# Patient Record
Sex: MALE | URBAN - METROPOLITAN AREA
[De-identification: names, ages, dates, MRNs, and addresses within clinical notes are randomized per-mention and may not be internally consistent; named-entity substitution may affect disease eponyms.]

---

## 2019-01-01 ENCOUNTER — APPOINTMENT (OUTPATIENT)
Dept: RADIOLOGY | Facility: MEDICAL CENTER | Age: 59
DRG: 981 | End: 2019-01-01
Attending: INTERNAL MEDICINE

## 2019-01-01 ENCOUNTER — HOSPITAL ENCOUNTER (INPATIENT)
Facility: MEDICAL CENTER | Age: 59
LOS: 5 days | DRG: 981 | End: 2019-01-08
Attending: EMERGENCY MEDICINE | Admitting: INTERNAL MEDICINE

## 2019-01-01 ENCOUNTER — APPOINTMENT (OUTPATIENT)
Dept: CARDIOLOGY | Facility: MEDICAL CENTER | Age: 59
DRG: 981 | End: 2019-01-01
Attending: INTERNAL MEDICINE

## 2019-01-01 VITALS
OXYGEN SATURATION: 62 % | WEIGHT: 158.29 LBS | DIASTOLIC BLOOD PRESSURE: 40 MMHG | TEMPERATURE: 98.6 F | HEART RATE: 68 BPM | HEIGHT: 70 IN | SYSTOLIC BLOOD PRESSURE: 64 MMHG | BODY MASS INDEX: 22.66 KG/M2

## 2019-01-01 DIAGNOSIS — F10.21 HISTORY OF ALCOHOL DEPENDENCE (HCC): ICD-10-CM

## 2019-01-01 DIAGNOSIS — K92.2 UPPER GI BLEED: ICD-10-CM

## 2019-01-01 DIAGNOSIS — R57.8 HEMORRHAGIC SHOCK (HCC): ICD-10-CM

## 2019-01-01 DIAGNOSIS — K72.10 END STAGE LIVER DISEASE (HCC): ICD-10-CM

## 2019-01-01 DIAGNOSIS — K92.2 GASTROINTESTINAL HEMORRHAGE, UNSPECIFIED GASTROINTESTINAL HEMORRHAGE TYPE: ICD-10-CM

## 2019-01-01 DIAGNOSIS — R57.9 SHOCK (HCC): ICD-10-CM

## 2019-01-01 LAB
ABO GROUP BLD: NORMAL
ABO GROUP BLD: NORMAL
ACTION RANGE TRIGGERED IACRT: NO
ACTION RANGE TRIGGERED IACRT: YES
ALBUMIN SERPL BCP-MCNC: 2.1 G/DL (ref 3.2–4.9)
ALBUMIN SERPL BCP-MCNC: 2.6 G/DL (ref 3.2–4.9)
ALBUMIN/GLOB SERPL: 0.6 G/DL
ALBUMIN/GLOB SERPL: 1.1 G/DL
ALP SERPL-CCNC: 70 U/L (ref 30–99)
ALP SERPL-CCNC: 98 U/L (ref 30–99)
ALT SERPL-CCNC: 34 U/L (ref 2–50)
ALT SERPL-CCNC: 38 U/L (ref 2–50)
AMMONIA PLAS-SCNC: 226 UMOL/L (ref 11–45)
AMMONIA PLAS-SCNC: 61 UMOL/L (ref 11–45)
AMOBARBITAL SERPL-MCNC: <50 NG/ML
AMPHET UR QL SCN: NEGATIVE
ANION GAP SERPL CALC-SCNC: 11 MMOL/L (ref 0–11.9)
ANION GAP SERPL CALC-SCNC: 12 MMOL/L (ref 0–11.9)
ANION GAP SERPL CALC-SCNC: 17 MMOL/L (ref 0–11.9)
ANION GAP SERPL CALC-SCNC: 27 MMOL/L (ref 0–11.9)
ANION GAP SERPL CALC-SCNC: 4 MMOL/L (ref 0–11.9)
ANION GAP SERPL CALC-SCNC: 5 MMOL/L (ref 0–11.9)
ANION GAP SERPL CALC-SCNC: 6 MMOL/L (ref 0–11.9)
ANION GAP SERPL CALC-SCNC: 9 MMOL/L (ref 0–11.9)
ANISOCYTOSIS BLD QL SMEAR: ABNORMAL
ANISOCYTOSIS BLD QL SMEAR: ABNORMAL
APAP SERPL-MCNC: <10 UG/ML (ref 10–30)
APAP SERPL-MCNC: <10 UG/ML (ref 10–30)
APPEARANCE UR: CLEAR
APTT PPP: 55.1 SEC (ref 24.7–36)
AST SERPL-CCNC: 60 U/L (ref 12–45)
AST SERPL-CCNC: 72 U/L (ref 12–45)
BACTERIA #/AREA URNS HPF: NEGATIVE /HPF
BACTERIA BLD CULT: NORMAL
BACTERIA BLD CULT: NORMAL
BACTERIA SPEC RESP CULT: ABNORMAL
BACTERIA SPEC RESP CULT: ABNORMAL
BARBITURATES UR QL SCN: NEGATIVE
BARCODED ABORH UBTYP: 5100
BARCODED ABORH UBTYP: 7300
BARCODED PRD CODE UBPRD: NORMAL
BARCODED UNIT NUM UBUNT: NORMAL
BASE EXCESS BLDA CALC-SCNC: -12 MMOL/L (ref -4–3)
BASE EXCESS BLDA CALC-SCNC: -3 MMOL/L (ref -4–3)
BASE EXCESS BLDA CALC-SCNC: -3 MMOL/L (ref -4–3)
BASE EXCESS BLDA CALC-SCNC: -4 MMOL/L (ref -4–3)
BASE EXCESS BLDA CALC-SCNC: -6 MMOL/L (ref -4–3)
BASE EXCESS BLDA CALC-SCNC: -8 MMOL/L (ref -4–3)
BASOPHILS # BLD AUTO: 0 % (ref 0–1.8)
BASOPHILS # BLD AUTO: 0.2 % (ref 0–1.8)
BASOPHILS # BLD AUTO: 0.3 % (ref 0–1.8)
BASOPHILS # BLD AUTO: 0.3 % (ref 0–1.8)
BASOPHILS # BLD AUTO: 0.6 % (ref 0–1.8)
BASOPHILS # BLD: 0 K/UL (ref 0–0.12)
BASOPHILS # BLD: 0.02 K/UL (ref 0–0.12)
BASOPHILS # BLD: 0.03 K/UL (ref 0–0.12)
BENZODIAZ SERPL QL: NEGATIVE
BENZODIAZ UR QL SCN: NEGATIVE
BILIRUB SERPL-MCNC: 2.4 MG/DL (ref 0.1–1.5)
BILIRUB SERPL-MCNC: 2.9 MG/DL (ref 0.1–1.5)
BILIRUB UR QL STRIP.AUTO: NEGATIVE
BLD GP AB SCN SERPL QL: NORMAL
BODY TEMPERATURE: ABNORMAL DEGREES
BUN SERPL-MCNC: 18 MG/DL (ref 8–22)
BUN SERPL-MCNC: 35 MG/DL (ref 8–22)
BUN SERPL-MCNC: 40 MG/DL (ref 8–22)
BUN SERPL-MCNC: 48 MG/DL (ref 8–22)
BUN SERPL-MCNC: 50 MG/DL (ref 8–22)
BUN SERPL-MCNC: 50 MG/DL (ref 8–22)
BUN SERPL-MCNC: 6 MG/DL (ref 8–22)
BUN SERPL-MCNC: 7 MG/DL (ref 8–22)
BURR CELLS BLD QL SMEAR: NORMAL
BUTALBITAL SERPL-MCNC: <50 NG/ML
BZE UR QL SCN: POSITIVE
CA-I BLD ISE-SCNC: 0.97 MMOL/L (ref 1.1–1.3)
CA-I BLD ISE-SCNC: 1.01 MMOL/L (ref 1.1–1.3)
CA-I SERPL-SCNC: 1 MMOL/L (ref 1.1–1.3)
CALCIUM SERPL-MCNC: 6.8 MG/DL (ref 8.5–10.5)
CALCIUM SERPL-MCNC: 6.9 MG/DL (ref 8.5–10.5)
CALCIUM SERPL-MCNC: 7.5 MG/DL (ref 8.5–10.5)
CALCIUM SERPL-MCNC: 7.6 MG/DL (ref 8.5–10.5)
CALCIUM SERPL-MCNC: 7.7 MG/DL (ref 8.5–10.5)
CALCIUM SERPL-MCNC: 7.7 MG/DL (ref 8.5–10.5)
CALCIUM SERPL-MCNC: 7.8 MG/DL (ref 8.5–10.5)
CALCIUM SERPL-MCNC: 7.8 MG/DL (ref 8.5–10.5)
CANNABINOIDS UR QL SCN: NEGATIVE
CFT BLD TEG: 6.4 MIN (ref 5–10)
CFT BLD TEG: 9 MIN (ref 5–10)
CHLORIDE SERPL-SCNC: 105 MMOL/L (ref 96–112)
CHLORIDE SERPL-SCNC: 107 MMOL/L (ref 96–112)
CHLORIDE SERPL-SCNC: 112 MMOL/L (ref 96–112)
CHLORIDE SERPL-SCNC: 114 MMOL/L (ref 96–112)
CHLORIDE SERPL-SCNC: 115 MMOL/L (ref 96–112)
CHLORIDE SERPL-SCNC: 115 MMOL/L (ref 96–112)
CHLORIDE SERPL-SCNC: 116 MMOL/L (ref 96–112)
CHLORIDE SERPL-SCNC: 117 MMOL/L (ref 96–112)
CLOT ANGLE BLD TEG: 59.2 DEGREES (ref 53–72)
CLOT ANGLE BLD TEG: 66.1 DEGREES (ref 53–72)
CLOT LYSIS 30M P MA LENFR BLD TEG: 0 % (ref 0–8)
CO2 BLDA-SCNC: 16 MMOL/L (ref 20–33)
CO2 BLDA-SCNC: 16 MMOL/L (ref 20–33)
CO2 BLDA-SCNC: 17 MMOL/L (ref 20–33)
CO2 BLDA-SCNC: 21 MMOL/L (ref 20–33)
CO2 BLDA-SCNC: 21 MMOL/L (ref 20–33)
CO2 BLDA-SCNC: 22 MMOL/L (ref 20–33)
CO2 SERPL-SCNC: 15 MMOL/L (ref 20–33)
CO2 SERPL-SCNC: 17 MMOL/L (ref 20–33)
CO2 SERPL-SCNC: 18 MMOL/L (ref 20–33)
CO2 SERPL-SCNC: 21 MMOL/L (ref 20–33)
CO2 SERPL-SCNC: 22 MMOL/L (ref 20–33)
CO2 SERPL-SCNC: 22 MMOL/L (ref 20–33)
CO2 SERPL-SCNC: 24 MMOL/L (ref 20–33)
CO2 SERPL-SCNC: 8 MMOL/L (ref 20–33)
COLOR UR: ABNORMAL
COMMENT 1642: NORMAL
COMPONENT FT 8504FT: NORMAL
COMPONENT FT 8504FT: NORMAL
COMPONENT P 8504P: NORMAL
COMPONENT R 8504R: NORMAL
CREAT SERPL-MCNC: 0.42 MG/DL (ref 0.5–1.4)
CREAT SERPL-MCNC: 0.5 MG/DL (ref 0.5–1.4)
CREAT SERPL-MCNC: 0.54 MG/DL (ref 0.5–1.4)
CREAT SERPL-MCNC: 0.85 MG/DL (ref 0.5–1.4)
CREAT SERPL-MCNC: 0.96 MG/DL (ref 0.5–1.4)
CREAT SERPL-MCNC: 1.09 MG/DL (ref 0.5–1.4)
CREAT SERPL-MCNC: 1.74 MG/DL (ref 0.5–1.4)
CREAT SERPL-MCNC: 1.99 MG/DL (ref 0.5–1.4)
CREAT UR-MCNC: 115.2 MG/DL
CRP SERPL HS-MCNC: 1.22 MG/DL (ref 0–0.75)
CT.EXTRINSIC BLD ROTEM: 1.7 MIN (ref 1–3)
CT.EXTRINSIC BLD ROTEM: 2.6 MIN (ref 1–3)
CYTOLOGY REG CYTOL: NORMAL
EOSINOPHIL # BLD AUTO: 0 K/UL (ref 0–0.51)
EOSINOPHIL # BLD AUTO: 0.02 K/UL (ref 0–0.51)
EOSINOPHIL # BLD AUTO: 0.03 K/UL (ref 0–0.51)
EOSINOPHIL # BLD AUTO: 0.08 K/UL (ref 0–0.51)
EOSINOPHIL # BLD AUTO: 0.11 K/UL (ref 0–0.51)
EOSINOPHIL NFR BLD: 0 % (ref 0–6.9)
EOSINOPHIL NFR BLD: 0.2 % (ref 0–6.9)
EOSINOPHIL NFR BLD: 0.3 % (ref 0–6.9)
EOSINOPHIL NFR BLD: 1.6 % (ref 0–6.9)
EOSINOPHIL NFR BLD: 1.8 % (ref 0–6.9)
EPI CELLS #/AREA URNS HPF: NEGATIVE /HPF
ERYTHROCYTE [DISTWIDTH] IN BLOOD BY AUTOMATED COUNT: 53.1 FL (ref 35.9–50)
ERYTHROCYTE [DISTWIDTH] IN BLOOD BY AUTOMATED COUNT: 53.5 FL (ref 35.9–50)
ERYTHROCYTE [DISTWIDTH] IN BLOOD BY AUTOMATED COUNT: 56.1 FL (ref 35.9–50)
ERYTHROCYTE [DISTWIDTH] IN BLOOD BY AUTOMATED COUNT: 62.4 FL (ref 35.9–50)
ERYTHROCYTE [DISTWIDTH] IN BLOOD BY AUTOMATED COUNT: 70.7 FL (ref 35.9–50)
ETHANOL BLD-MCNC: 0 G/DL
FIBRINOGEN PPP-MCNC: 182 MG/DL (ref 215–460)
GLOBULIN SER CALC-MCNC: 2.4 G/DL (ref 1.9–3.5)
GLOBULIN SER CALC-MCNC: 3.4 G/DL (ref 1.9–3.5)
GLUCOSE BLD-MCNC: 100 MG/DL (ref 65–99)
GLUCOSE BLD-MCNC: 106 MG/DL (ref 65–99)
GLUCOSE BLD-MCNC: 107 MG/DL (ref 65–99)
GLUCOSE BLD-MCNC: 117 MG/DL (ref 65–99)
GLUCOSE BLD-MCNC: 119 MG/DL (ref 65–99)
GLUCOSE BLD-MCNC: 119 MG/DL (ref 65–99)
GLUCOSE BLD-MCNC: 123 MG/DL (ref 65–99)
GLUCOSE BLD-MCNC: 129 MG/DL (ref 65–99)
GLUCOSE BLD-MCNC: 129 MG/DL (ref 65–99)
GLUCOSE BLD-MCNC: 130 MG/DL (ref 65–99)
GLUCOSE BLD-MCNC: 143 MG/DL (ref 65–99)
GLUCOSE BLD-MCNC: 81 MG/DL (ref 65–99)
GLUCOSE SERPL-MCNC: 111 MG/DL (ref 65–99)
GLUCOSE SERPL-MCNC: 115 MG/DL (ref 65–99)
GLUCOSE SERPL-MCNC: 122 MG/DL (ref 65–99)
GLUCOSE SERPL-MCNC: 128 MG/DL (ref 65–99)
GLUCOSE SERPL-MCNC: 129 MG/DL (ref 65–99)
GLUCOSE SERPL-MCNC: 146 MG/DL (ref 65–99)
GLUCOSE SERPL-MCNC: 147 MG/DL (ref 65–99)
GLUCOSE SERPL-MCNC: 97 MG/DL (ref 65–99)
GLUCOSE UR STRIP.AUTO-MCNC: NEGATIVE MG/DL
GRAM STN SPEC: ABNORMAL
GRAM STN SPEC: ABNORMAL
HAV IGM SERPL QL IA: NEGATIVE
HBV CORE IGM SER QL: NEGATIVE
HBV SURFACE AG SER QL: NEGATIVE
HCO3 BLDA-SCNC: 15 MMOL/L (ref 17–25)
HCO3 BLDA-SCNC: 15.1 MMOL/L (ref 17–25)
HCO3 BLDA-SCNC: 16.1 MMOL/L (ref 17–25)
HCO3 BLDA-SCNC: 20 MMOL/L (ref 17–25)
HCO3 BLDA-SCNC: 20.5 MMOL/L (ref 17–25)
HCO3 BLDA-SCNC: 20.8 MMOL/L (ref 17–25)
HCT VFR BLD AUTO: 18.7 % (ref 42–52)
HCT VFR BLD AUTO: 20.9 % (ref 42–52)
HCT VFR BLD AUTO: 21.1 % (ref 42–52)
HCT VFR BLD AUTO: 21.2 % (ref 42–52)
HCT VFR BLD AUTO: 21.2 % (ref 42–52)
HCT VFR BLD AUTO: 21.3 % (ref 42–52)
HCT VFR BLD AUTO: 21.5 % (ref 42–52)
HCT VFR BLD AUTO: 21.6 % (ref 42–52)
HCT VFR BLD AUTO: 21.9 % (ref 42–52)
HCT VFR BLD AUTO: 22 % (ref 42–52)
HCT VFR BLD AUTO: 22.4 % (ref 42–52)
HCT VFR BLD AUTO: 22.4 % (ref 42–52)
HCT VFR BLD CALC: 20 % (ref 42–52)
HCT VFR BLD CALC: 23 % (ref 42–52)
HCV AB SER QL: NEGATIVE
HGB BLD-MCNC: 6.2 G/DL (ref 14–18)
HGB BLD-MCNC: 6.8 G/DL (ref 14–18)
HGB BLD-MCNC: 7.2 G/DL (ref 14–18)
HGB BLD-MCNC: 7.3 G/DL (ref 14–18)
HGB BLD-MCNC: 7.3 G/DL (ref 14–18)
HGB BLD-MCNC: 7.4 G/DL (ref 14–18)
HGB BLD-MCNC: 7.5 G/DL (ref 14–18)
HGB BLD-MCNC: 7.6 G/DL (ref 14–18)
HGB BLD-MCNC: 7.6 G/DL (ref 14–18)
HGB BLD-MCNC: 7.8 G/DL (ref 14–18)
HGB BLD-MCNC: 7.8 G/DL (ref 14–18)
HGB BLD-MCNC: 7.9 G/DL (ref 14–18)
HGB BLD-MCNC: 7.9 G/DL (ref 14–18)
HGB BLD-MCNC: 8.1 G/DL (ref 14–18)
HIV 1+2 AB+HIV1 P24 AG SERPL QL IA: NON REACTIVE
HOROWITZ INDEX BLDA+IHG-RTO: 293 MM[HG]
HOROWITZ INDEX BLDA+IHG-RTO: 297 MM[HG]
HOROWITZ INDEX BLDA+IHG-RTO: 333 MM[HG]
HOROWITZ INDEX BLDA+IHG-RTO: 397 MM[HG]
HOROWITZ INDEX BLDA+IHG-RTO: 430 MM[HG]
HOROWITZ INDEX BLDA+IHG-RTO: 437 MM[HG]
HYALINE CASTS #/AREA URNS LPF: ABNORMAL /LPF
IMM GRANULOCYTES # BLD AUTO: 0.01 K/UL (ref 0–0.11)
IMM GRANULOCYTES # BLD AUTO: 0.03 K/UL (ref 0–0.11)
IMM GRANULOCYTES # BLD AUTO: 0.04 K/UL (ref 0–0.11)
IMM GRANULOCYTES # BLD AUTO: 0.04 K/UL (ref 0–0.11)
IMM GRANULOCYTES NFR BLD AUTO: 0.2 % (ref 0–0.9)
IMM GRANULOCYTES NFR BLD AUTO: 0.3 % (ref 0–0.9)
IMM GRANULOCYTES NFR BLD AUTO: 0.3 % (ref 0–0.9)
IMM GRANULOCYTES NFR BLD AUTO: 0.4 % (ref 0–0.9)
INR PPP: 4.98 (ref 0.87–1.13)
INST. QUALIFIED PATIENT IIQPT: YES
KETONES UR STRIP.AUTO-MCNC: ABNORMAL MG/DL
LACTATE BLD-SCNC: 2.1 MMOL/L (ref 0.5–2)
LACTATE BLD-SCNC: 3.2 MMOL/L (ref 0.5–2)
LEUKOCYTE ESTERASE UR QL STRIP.AUTO: ABNORMAL
LIPASE SERPL-CCNC: 10 U/L (ref 11–82)
LV EJECT FRACT  99904: 70
LV EJECT FRACT MOD 2C 99903: 67.63
LV EJECT FRACT MOD 4C 99902: 56.26
LV EJECT FRACT MOD BP 99901: 61.86
LYMPHOCYTES # BLD AUTO: 1.12 K/UL (ref 1–4.8)
LYMPHOCYTES # BLD AUTO: 1.43 K/UL (ref 1–4.8)
LYMPHOCYTES # BLD AUTO: 1.88 K/UL (ref 1–4.8)
LYMPHOCYTES # BLD AUTO: 2.12 K/UL (ref 1–4.8)
LYMPHOCYTES # BLD AUTO: 2.2 K/UL (ref 1–4.8)
LYMPHOCYTES NFR BLD: 13.5 % (ref 22–41)
LYMPHOCYTES NFR BLD: 18.2 % (ref 22–41)
LYMPHOCYTES NFR BLD: 18.7 % (ref 22–41)
LYMPHOCYTES NFR BLD: 22.7 % (ref 22–41)
LYMPHOCYTES NFR BLD: 33.6 % (ref 22–41)
MACROCYTES BLD QL SMEAR: ABNORMAL
MACROCYTES BLD QL SMEAR: ABNORMAL
MAGNESIUM SERPL-MCNC: 1 MG/DL (ref 1.5–2.5)
MAGNESIUM SERPL-MCNC: 1 MG/DL (ref 1.5–2.5)
MAGNESIUM SERPL-MCNC: 1.7 MG/DL (ref 1.5–2.5)
MAGNESIUM SERPL-MCNC: 2.1 MG/DL (ref 1.5–2.5)
MAGNESIUM SERPL-MCNC: 2.4 MG/DL (ref 1.5–2.5)
MANUAL DIFF BLD: NORMAL
MCF BLD TEG: 53.1 MM (ref 50–70)
MCF BLD TEG: 59.6 MM (ref 50–70)
MCH RBC QN AUTO: 30.6 PG (ref 27–33)
MCH RBC QN AUTO: 31.1 PG (ref 27–33)
MCH RBC QN AUTO: 31.7 PG (ref 27–33)
MCH RBC QN AUTO: 32.9 PG (ref 27–33)
MCH RBC QN AUTO: 34.3 PG (ref 27–33)
MCHC RBC AUTO-ENTMCNC: 33.2 G/DL (ref 33.7–35.3)
MCHC RBC AUTO-ENTMCNC: 33.8 G/DL (ref 33.7–35.3)
MCHC RBC AUTO-ENTMCNC: 34.4 G/DL (ref 33.7–35.3)
MCHC RBC AUTO-ENTMCNC: 35.8 G/DL (ref 33.7–35.3)
MCHC RBC AUTO-ENTMCNC: 36.3 G/DL (ref 33.7–35.3)
MCV RBC AUTO: 103.3 FL (ref 81.4–97.8)
MCV RBC AUTO: 88.3 FL (ref 81.4–97.8)
MCV RBC AUTO: 88.9 FL (ref 81.4–97.8)
MCV RBC AUTO: 90.7 FL (ref 81.4–97.8)
MCV RBC AUTO: 92 FL (ref 81.4–97.8)
METHADONE UR QL SCN: NEGATIVE
MICRO URNS: ABNORMAL
MONOCYTES # BLD AUTO: 0.28 K/UL (ref 0–0.85)
MONOCYTES # BLD AUTO: 0.43 K/UL (ref 0–0.85)
MONOCYTES # BLD AUTO: 0.55 K/UL (ref 0–0.85)
MONOCYTES # BLD AUTO: 0.63 K/UL (ref 0–0.85)
MONOCYTES # BLD AUTO: 0.86 K/UL (ref 0–0.85)
MONOCYTES NFR BLD AUTO: 4.4 % (ref 0–13.4)
MONOCYTES NFR BLD AUTO: 5.3 % (ref 0–13.4)
MONOCYTES NFR BLD AUTO: 5.4 % (ref 0–13.4)
MONOCYTES NFR BLD AUTO: 8.1 % (ref 0–13.4)
MONOCYTES NFR BLD AUTO: 8.7 % (ref 0–13.4)
MORPHOLOGY BLD-IMP: NORMAL
MORPHOLOGY BLD-IMP: NORMAL
NEUTROPHILS # BLD AUTO: 3.27 K/UL (ref 1.82–7.42)
NEUTROPHILS # BLD AUTO: 3.79 K/UL (ref 1.82–7.42)
NEUTROPHILS # BLD AUTO: 7.83 K/UL (ref 1.82–7.42)
NEUTROPHILS # BLD AUTO: 8.29 K/UL (ref 1.82–7.42)
NEUTROPHILS # BLD AUTO: 8.83 K/UL (ref 1.82–7.42)
NEUTROPHILS NFR BLD: 60.2 % (ref 44–72)
NEUTROPHILS NFR BLD: 66.2 % (ref 44–72)
NEUTROPHILS NFR BLD: 75 % (ref 44–72)
NEUTROPHILS NFR BLD: 75.6 % (ref 44–72)
NEUTROPHILS NFR BLD: 77.9 % (ref 44–72)
NITRITE UR QL STRIP.AUTO: NEGATIVE
NRBC # BLD AUTO: 0 K/UL
NRBC BLD-RTO: 0 /100 WBC
O2/TOTAL GAS SETTING VFR VENT: 30 %
O2/TOTAL GAS SETTING VFR VENT: 40 %
O2/TOTAL GAS SETTING VFR VENT: 40 %
OPIATES UR QL SCN: NEGATIVE
OSMOLALITY UR: 442 MOSM/KG H2O (ref 300–900)
OVALOCYTES BLD QL SMEAR: NORMAL
OXYCODONE UR QL SCN: NEGATIVE
PA AA BLD-ACNC: 86.5 %
PA AA BLD-ACNC: 90.5 %
PA ADP BLD-ACNC: 78.5 %
PA ADP BLD-ACNC: 93 %
PCO2 BLDA: 19.5 MMHG (ref 26–37)
PCO2 BLDA: 20.6 MMHG (ref 26–37)
PCO2 BLDA: 25.4 MMHG (ref 26–37)
PCO2 BLDA: 30.5 MMHG (ref 26–37)
PCO2 BLDA: 34.3 MMHG (ref 26–37)
PCO2 BLDA: 39.4 MMHG (ref 26–37)
PCO2 TEMP ADJ BLDA: 19.1 MMHG (ref 26–37)
PCO2 TEMP ADJ BLDA: 20 MMHG (ref 26–37)
PCO2 TEMP ADJ BLDA: 25.8 MMHG (ref 26–37)
PCO2 TEMP ADJ BLDA: 30.6 MMHG (ref 26–37)
PCO2 TEMP ADJ BLDA: 34.5 MMHG (ref 26–37)
PCP UR QL SCN: NEGATIVE
PENTOBARB SERPL-MCNC: <50 NG/ML
PH BLDA: 7.19 [PH] (ref 7.4–7.5)
PH BLDA: 7.38 [PH] (ref 7.4–7.5)
PH BLDA: 7.44 [PH] (ref 7.4–7.5)
PH BLDA: 7.47 [PH] (ref 7.4–7.5)
PH BLDA: 7.5 [PH] (ref 7.4–7.5)
PH BLDA: 7.52 [PH] (ref 7.4–7.5)
PH TEMP ADJ BLDA: 7.38 [PH] (ref 7.4–7.5)
PH TEMP ADJ BLDA: 7.44 [PH] (ref 7.4–7.5)
PH TEMP ADJ BLDA: 7.48 [PH] (ref 7.4–7.5)
PH TEMP ADJ BLDA: 7.5 [PH] (ref 7.4–7.5)
PH TEMP ADJ BLDA: 7.53 [PH] (ref 7.4–7.5)
PH UR STRIP.AUTO: 5 [PH]
PHENOBARB SERPL-MCNC: <50 NG/ML
PHOSPHATE SERPL-MCNC: 1.6 MG/DL (ref 2.5–4.5)
PHOSPHATE SERPL-MCNC: 1.7 MG/DL (ref 2.5–4.5)
PHOSPHATE SERPL-MCNC: 3.2 MG/DL (ref 2.5–4.5)
PHOSPHATE SERPL-MCNC: 3.7 MG/DL (ref 2.5–4.5)
PLATELET # BLD AUTO: 121 K/UL (ref 164–446)
PLATELET # BLD AUTO: 124 K/UL (ref 164–446)
PLATELET # BLD AUTO: 135 K/UL (ref 164–446)
PLATELET # BLD AUTO: 139 K/UL (ref 164–446)
PLATELET # BLD AUTO: 140 K/UL (ref 164–446)
PLATELET BLD QL SMEAR: NORMAL
PLATELET BLD QL SMEAR: NORMAL
PMV BLD AUTO: 10.8 FL (ref 9–12.9)
PMV BLD AUTO: 9.6 FL (ref 9–12.9)
PMV BLD AUTO: 9.7 FL (ref 9–12.9)
PMV BLD AUTO: 9.8 FL (ref 9–12.9)
PMV BLD AUTO: 9.8 FL (ref 9–12.9)
PO2 BLDA: 100 MMHG (ref 64–87)
PO2 BLDA: 117 MMHG (ref 64–87)
PO2 BLDA: 119 MMHG (ref 64–87)
PO2 BLDA: 131 MMHG (ref 64–87)
PO2 BLDA: 172 MMHG (ref 64–87)
PO2 BLDA: 89 MMHG (ref 64–87)
PO2 TEMP ADJ BLDA: 100 MMHG (ref 64–87)
PO2 TEMP ADJ BLDA: 121 MMHG (ref 64–87)
PO2 TEMP ADJ BLDA: 128 MMHG (ref 64–87)
PO2 TEMP ADJ BLDA: 169 MMHG (ref 64–87)
PO2 TEMP ADJ BLDA: 90 MMHG (ref 64–87)
POTASSIUM BLD-SCNC: 3.2 MMOL/L (ref 3.6–5.5)
POTASSIUM BLD-SCNC: 3.3 MMOL/L (ref 3.6–5.5)
POTASSIUM SERPL-SCNC: 2.9 MMOL/L (ref 3.6–5.5)
POTASSIUM SERPL-SCNC: 2.9 MMOL/L (ref 3.6–5.5)
POTASSIUM SERPL-SCNC: 3 MMOL/L (ref 3.6–5.5)
POTASSIUM SERPL-SCNC: 3 MMOL/L (ref 3.6–5.5)
POTASSIUM SERPL-SCNC: 3.1 MMOL/L (ref 3.6–5.5)
POTASSIUM SERPL-SCNC: 3.2 MMOL/L (ref 3.6–5.5)
POTASSIUM SERPL-SCNC: 3.6 MMOL/L (ref 3.6–5.5)
POTASSIUM SERPL-SCNC: 3.6 MMOL/L (ref 3.6–5.5)
POTASSIUM SERPL-SCNC: 3.7 MMOL/L (ref 3.6–5.5)
POTASSIUM SERPL-SCNC: 3.8 MMOL/L (ref 3.6–5.5)
POTASSIUM SERPL-SCNC: 3.9 MMOL/L (ref 3.6–5.5)
POTASSIUM SERPL-SCNC: 4.1 MMOL/L (ref 3.6–5.5)
POTASSIUM SERPL-SCNC: 4.1 MMOL/L (ref 3.6–5.5)
PREALB SERPL-MCNC: 8 MG/DL (ref 18–38)
PROCALCITONIN SERPL-MCNC: 7.16 NG/ML
PROCALCITONIN SERPL-MCNC: 8.01 NG/ML
PRODUCT TYPE UPROD: NORMAL
PROPOXYPH UR QL SCN: ABNORMAL
PROT SERPL-MCNC: 5 G/DL (ref 6–8.2)
PROT SERPL-MCNC: 5.5 G/DL (ref 6–8.2)
PROT UR QL STRIP: NEGATIVE MG/DL
PROTHROMBIN TIME: 46.2 SEC (ref 12–14.6)
RBC # BLD AUTO: 1.81 M/UL (ref 4.7–6.1)
RBC # BLD AUTO: 2.35 M/UL (ref 4.7–6.1)
RBC # BLD AUTO: 2.37 M/UL (ref 4.7–6.1)
RBC # BLD AUTO: 2.38 M/UL (ref 4.7–6.1)
RBC # BLD AUTO: 2.4 M/UL (ref 4.7–6.1)
RBC # URNS HPF: ABNORMAL /HPF
RBC BLD AUTO: PRESENT
RBC BLD AUTO: PRESENT
RBC UR QL AUTO: NEGATIVE
RH BLD: NORMAL
RH BLD: NORMAL
RHODAMINE-AURAMINE STN SPEC: NORMAL
SALICYLATES SERPL-MCNC: 0 MG/DL (ref 15–25)
SALICYLATES SERPL-MCNC: 0 MG/DL (ref 15–25)
SAO2 % BLDA: 100 % (ref 93–99)
SAO2 % BLDA: 97 % (ref 93–99)
SAO2 % BLDA: 97 % (ref 93–99)
SAO2 % BLDA: 98 % (ref 93–99)
SAO2 % BLDA: 99 % (ref 93–99)
SAO2 % BLDA: 99 % (ref 93–99)
SECOBARBITAL SERPL-MCNC: <50 NG/ML
SIGNIFICANT IND 70042: ABNORMAL
SIGNIFICANT IND 70042: ABNORMAL
SIGNIFICANT IND 70042: NORMAL
SITE SITE: ABNORMAL
SITE SITE: ABNORMAL
SITE SITE: NORMAL
SMUDGE CELLS BLD QL SMEAR: NORMAL
SODIUM BLD-SCNC: 143 MMOL/L (ref 135–145)
SODIUM BLD-SCNC: 144 MMOL/L (ref 135–145)
SODIUM SERPL-SCNC: 139 MMOL/L (ref 135–145)
SODIUM SERPL-SCNC: 140 MMOL/L (ref 135–145)
SODIUM SERPL-SCNC: 142 MMOL/L (ref 135–145)
SODIUM SERPL-SCNC: 142 MMOL/L (ref 135–145)
SODIUM SERPL-SCNC: 143 MMOL/L (ref 135–145)
SODIUM SERPL-SCNC: 143 MMOL/L (ref 135–145)
SODIUM SERPL-SCNC: 145 MMOL/L (ref 135–145)
SODIUM SERPL-SCNC: 145 MMOL/L (ref 135–145)
SODIUM UR-SCNC: 14 MMOL/L
SOURCE SOURCE: ABNORMAL
SOURCE SOURCE: ABNORMAL
SOURCE SOURCE: NORMAL
SP GR UR STRIP.AUTO: 1.02
SPECIMEN DRAWN FROM PATIENT: ABNORMAL
TEG ALGORITHM TGALG: NORMAL
TEG ALGORITHM TGALG: NORMAL
TEST NAME 95000: NORMAL
TRICYCLICS SERPL QL: NEGATIVE
TRIGL SERPL-MCNC: 96 MG/DL (ref 0–149)
TROPONIN I SERPL-MCNC: 0.19 NG/ML (ref 0–0.04)
TROPONIN I SERPL-MCNC: 0.98 NG/ML (ref 0–0.04)
TROPONIN I SERPL-MCNC: 1.19 NG/ML (ref 0–0.04)
TROPONIN I SERPL-MCNC: 1.58 NG/ML (ref 0–0.04)
UNIT STATUS USTAT: NORMAL
UROBILINOGEN UR STRIP.AUTO-MCNC: 0.2 MG/DL
WBC # BLD AUTO: 10.4 K/UL (ref 4.8–10.8)
WBC # BLD AUTO: 10.6 K/UL (ref 4.8–10.8)
WBC # BLD AUTO: 11.8 K/UL (ref 4.8–10.8)
WBC # BLD AUTO: 4.9 K/UL (ref 4.8–10.8)
WBC # BLD AUTO: 6.3 K/UL (ref 4.8–10.8)
WBC #/AREA URNS HPF: ABNORMAL /HPF

## 2019-01-01 PROCEDURE — 82330 ASSAY OF CALCIUM: CPT

## 2019-01-01 PROCEDURE — 99231 SBSQ HOSP IP/OBS SF/LOW 25: CPT | Performed by: INTERNAL MEDICINE

## 2019-01-01 PROCEDURE — 85014 HEMATOCRIT: CPT | Mod: 91

## 2019-01-01 PROCEDURE — 83605 ASSAY OF LACTIC ACID: CPT | Mod: 91

## 2019-01-01 PROCEDURE — 700111 HCHG RX REV CODE 636 W/ 250 OVERRIDE (IP): Performed by: INTERNAL MEDICINE

## 2019-01-01 PROCEDURE — 700102 HCHG RX REV CODE 250 W/ 637 OVERRIDE(OP): Performed by: INTERNAL MEDICINE

## 2019-01-01 PROCEDURE — 700101 HCHG RX REV CODE 250: Performed by: INTERNAL MEDICINE

## 2019-01-01 PROCEDURE — 30233R1 TRANSFUSION OF NONAUTOLOGOUS PLATELETS INTO PERIPHERAL VEIN, PERCUTANEOUS APPROACH: ICD-10-PCS | Performed by: INTERNAL MEDICINE

## 2019-01-01 PROCEDURE — 700111 HCHG RX REV CODE 636 W/ 250 OVERRIDE (IP): Performed by: HOSPITALIST

## 2019-01-01 PROCEDURE — 500066 HCHG BITE BLOCK, ECT: Performed by: INTERNAL MEDICINE

## 2019-01-01 PROCEDURE — 93306 TTE W/DOPPLER COMPLETE: CPT | Mod: 26 | Performed by: INTERNAL MEDICINE

## 2019-01-01 PROCEDURE — P9045 ALBUMIN (HUMAN), 5%, 250 ML: HCPCS | Performed by: INTERNAL MEDICINE

## 2019-01-01 PROCEDURE — 85025 COMPLETE CBC W/AUTO DIFF WBC: CPT

## 2019-01-01 PROCEDURE — 700101 HCHG RX REV CODE 250: Performed by: HOSPITALIST

## 2019-01-01 PROCEDURE — 85384 FIBRINOGEN ACTIVITY: CPT

## 2019-01-01 PROCEDURE — 84300 ASSAY OF URINE SODIUM: CPT

## 2019-01-01 PROCEDURE — A9270 NON-COVERED ITEM OR SERVICE: HCPCS | Performed by: INTERNAL MEDICINE

## 2019-01-01 PROCEDURE — 36600 WITHDRAWAL OF ARTERIAL BLOOD: CPT

## 2019-01-01 PROCEDURE — 87116 MYCOBACTERIA CULTURE: CPT

## 2019-01-01 PROCEDURE — 700105 HCHG RX REV CODE 258: Performed by: INTERNAL MEDICINE

## 2019-01-01 PROCEDURE — 700105 HCHG RX REV CODE 258: Performed by: HOSPITALIST

## 2019-01-01 PROCEDURE — 83690 ASSAY OF LIPASE: CPT

## 2019-01-01 PROCEDURE — 84484 ASSAY OF TROPONIN QUANT: CPT | Mod: 91

## 2019-01-01 PROCEDURE — 80053 COMPREHEN METABOLIC PANEL: CPT

## 2019-01-01 PROCEDURE — 31624 DX BRONCHOSCOPE/LAVAGE: CPT | Performed by: INTERNAL MEDICINE

## 2019-01-01 PROCEDURE — 36430 TRANSFUSION BLD/BLD COMPNT: CPT

## 2019-01-01 PROCEDURE — P9017 PLASMA 1 DONOR FRZ W/IN 8 HR: HCPCS | Mod: 91

## 2019-01-01 PROCEDURE — 770022 HCHG ROOM/CARE - ICU (200)

## 2019-01-01 PROCEDURE — 160203 HCHG ENDO MINUTES - 1ST 30 MINS LEVEL 4: Performed by: INTERNAL MEDICINE

## 2019-01-01 PROCEDURE — 85347 COAGULATION TIME ACTIVATED: CPT

## 2019-01-01 PROCEDURE — 99152 MOD SED SAME PHYS/QHP 5/>YRS: CPT | Performed by: INTERNAL MEDICINE

## 2019-01-01 PROCEDURE — 87102 FUNGUS ISOLATION CULTURE: CPT

## 2019-01-01 PROCEDURE — 84132 ASSAY OF SERUM POTASSIUM: CPT | Mod: 91

## 2019-01-01 PROCEDURE — 87077 CULTURE AEROBIC IDENTIFY: CPT

## 2019-01-01 PROCEDURE — 99291 CRITICAL CARE FIRST HOUR: CPT | Mod: 25 | Performed by: INTERNAL MEDICINE

## 2019-01-01 PROCEDURE — 70450 CT HEAD/BRAIN W/O DYE: CPT

## 2019-01-01 PROCEDURE — 87070 CULTURE OTHR SPECIMN AEROBIC: CPT

## 2019-01-01 PROCEDURE — 06L38CZ OCCLUSION OF ESOPHAGEAL VEIN WITH EXTRALUMINAL DEVICE, VIA NATURAL OR ARTIFICIAL OPENING ENDOSCOPIC: ICD-10-PCS | Performed by: INTERNAL MEDICINE

## 2019-01-01 PROCEDURE — 85018 HEMOGLOBIN: CPT | Mod: 91

## 2019-01-01 PROCEDURE — C9113 INJ PANTOPRAZOLE SODIUM, VIA: HCPCS | Performed by: INTERNAL MEDICINE

## 2019-01-01 PROCEDURE — 770001 HCHG ROOM/CARE - MED/SURG/GYN PRIV*

## 2019-01-01 PROCEDURE — 85576 BLOOD PLATELET AGGREGATION: CPT

## 2019-01-01 PROCEDURE — 02HV33Z INSERTION OF INFUSION DEVICE INTO SUPERIOR VENA CAVA, PERCUTANEOUS APPROACH: ICD-10-PCS | Performed by: INTERNAL MEDICINE

## 2019-01-01 PROCEDURE — 30233N1 TRANSFUSION OF NONAUTOLOGOUS RED BLOOD CELLS INTO PERIPHERAL VEIN, PERCUTANEOUS APPROACH: ICD-10-PCS | Performed by: INTERNAL MEDICINE

## 2019-01-01 PROCEDURE — 84478 ASSAY OF TRIGLYCERIDES: CPT

## 2019-01-01 PROCEDURE — 85610 PROTHROMBIN TIME: CPT

## 2019-01-01 PROCEDURE — 87205 SMEAR GRAM STAIN: CPT

## 2019-01-01 PROCEDURE — 85007 BL SMEAR W/DIFF WBC COUNT: CPT

## 2019-01-01 PROCEDURE — 88305 TISSUE EXAM BY PATHOLOGIST: CPT

## 2019-01-01 PROCEDURE — 96365 THER/PROPH/DIAG IV INF INIT: CPT

## 2019-01-01 PROCEDURE — 303105 HCHG CATHETER EXTRA

## 2019-01-01 PROCEDURE — 86850 RBC ANTIBODY SCREEN: CPT

## 2019-01-01 PROCEDURE — 82140 ASSAY OF AMMONIA: CPT

## 2019-01-01 PROCEDURE — 71045 X-RAY EXAM CHEST 1 VIEW: CPT

## 2019-01-01 PROCEDURE — P9016 RBC LEUKOCYTES REDUCED: HCPCS

## 2019-01-01 PROCEDURE — 80307 DRUG TEST PRSMV CHEM ANLYZR: CPT

## 2019-01-01 PROCEDURE — 36556 INSERT NON-TUNNEL CV CATH: CPT

## 2019-01-01 PROCEDURE — 94003 VENT MGMT INPAT SUBQ DAY: CPT

## 2019-01-01 PROCEDURE — 85014 HEMATOCRIT: CPT

## 2019-01-01 PROCEDURE — 84132 ASSAY OF SERUM POTASSIUM: CPT

## 2019-01-01 PROCEDURE — 87186 SC STD MICRODIL/AGAR DIL: CPT

## 2019-01-01 PROCEDURE — 87015 SPECIMEN INFECT AGNT CONCNTJ: CPT

## 2019-01-01 PROCEDURE — P9034 PLATELETS, PHERESIS: HCPCS

## 2019-01-01 PROCEDURE — 99233 SBSQ HOSP IP/OBS HIGH 50: CPT | Performed by: HOSPITALIST

## 2019-01-01 PROCEDURE — 80048 BASIC METABOLIC PNL TOTAL CA: CPT | Mod: 91

## 2019-01-01 PROCEDURE — 84100 ASSAY OF PHOSPHORUS: CPT

## 2019-01-01 PROCEDURE — 0DJ68ZZ INSPECTION OF STOMACH, VIA NATURAL OR ARTIFICIAL OPENING ENDOSCOPIC: ICD-10-PCS | Performed by: INTERNAL MEDICINE

## 2019-01-01 PROCEDURE — 302978 HCHG BRONCHOSCOPY-DIAGNOSTIC

## 2019-01-01 PROCEDURE — 99291 CRITICAL CARE FIRST HOUR: CPT

## 2019-01-01 PROCEDURE — 99231 SBSQ HOSP IP/OBS SF/LOW 25: CPT | Performed by: HOSPITALIST

## 2019-01-01 PROCEDURE — 94002 VENT MGMT INPAT INIT DAY: CPT

## 2019-01-01 PROCEDURE — 0BH18EZ INSERTION OF ENDOTRACHEAL AIRWAY INTO TRACHEA, VIA NATURAL OR ARTIFICIAL OPENING ENDOSCOPIC: ICD-10-PCS | Performed by: INTERNAL MEDICINE

## 2019-01-01 PROCEDURE — 87206 SMEAR FLUORESCENT/ACID STAI: CPT

## 2019-01-01 PROCEDURE — 86901 BLOOD TYPING SEROLOGIC RH(D): CPT

## 2019-01-01 PROCEDURE — 0B9F8ZX DRAINAGE OF RIGHT LOWER LUNG LOBE, VIA NATURAL OR ARTIFICIAL OPENING ENDOSCOPIC, DIAGNOSTIC: ICD-10-PCS | Performed by: INTERNAL MEDICINE

## 2019-01-01 PROCEDURE — 700111 HCHG RX REV CODE 636 W/ 250 OVERRIDE (IP): Performed by: EMERGENCY MEDICINE

## 2019-01-01 PROCEDURE — 83735 ASSAY OF MAGNESIUM: CPT

## 2019-01-01 PROCEDURE — 82570 ASSAY OF URINE CREATININE: CPT

## 2019-01-01 PROCEDURE — 99291 CRITICAL CARE FIRST HOUR: CPT | Performed by: INTERNAL MEDICINE

## 2019-01-01 PROCEDURE — 304561 HCHG STAT O2

## 2019-01-01 PROCEDURE — 84295 ASSAY OF SERUM SODIUM: CPT

## 2019-01-01 PROCEDURE — 96367 TX/PROPH/DG ADDL SEQ IV INF: CPT

## 2019-01-01 PROCEDURE — 31645 BRNCHSC W/THER ASPIR 1ST: CPT | Performed by: INTERNAL MEDICINE

## 2019-01-01 PROCEDURE — 5A1945Z RESPIRATORY VENTILATION, 24-96 CONSECUTIVE HOURS: ICD-10-PCS | Performed by: INTERNAL MEDICINE

## 2019-01-01 PROCEDURE — 80048 BASIC METABOLIC PNL TOTAL CA: CPT

## 2019-01-01 PROCEDURE — 94640 AIRWAY INHALATION TREATMENT: CPT

## 2019-01-01 PROCEDURE — 86140 C-REACTIVE PROTEIN: CPT

## 2019-01-01 PROCEDURE — 93306 TTE W/DOPPLER COMPLETE: CPT

## 2019-01-01 PROCEDURE — C9113 INJ PANTOPRAZOLE SODIUM, VIA: HCPCS | Performed by: EMERGENCY MEDICINE

## 2019-01-01 PROCEDURE — 82962 GLUCOSE BLOOD TEST: CPT

## 2019-01-01 PROCEDURE — 82803 BLOOD GASES ANY COMBINATION: CPT

## 2019-01-01 PROCEDURE — 31500 INSERT EMERGENCY AIRWAY: CPT

## 2019-01-01 PROCEDURE — 84134 ASSAY OF PREALBUMIN: CPT

## 2019-01-01 PROCEDURE — 86900 BLOOD TYPING SEROLOGIC ABO: CPT

## 2019-01-01 PROCEDURE — 83935 ASSAY OF URINE OSMOLALITY: CPT

## 2019-01-01 PROCEDURE — 99292 CRITICAL CARE ADDL 30 MIN: CPT | Performed by: INTERNAL MEDICINE

## 2019-01-01 PROCEDURE — C1751 CATH, INF, PER/CENT/MIDLINE: HCPCS

## 2019-01-01 PROCEDURE — 36556 INSERT NON-TUNNEL CV CATH: CPT | Mod: RT | Performed by: INTERNAL MEDICINE

## 2019-01-01 PROCEDURE — 80074 ACUTE HEPATITIS PANEL: CPT

## 2019-01-01 PROCEDURE — C1751 CATH, INF, PER/CENT/MIDLINE: HCPCS | Performed by: INTERNAL MEDICINE

## 2019-01-01 PROCEDURE — 86923 COMPATIBILITY TEST ELECTRIC: CPT

## 2019-01-01 PROCEDURE — 74176 CT ABD & PELVIS W/O CONTRAST: CPT

## 2019-01-01 PROCEDURE — 31500 INSERT EMERGENCY AIRWAY: CPT | Performed by: INTERNAL MEDICINE

## 2019-01-01 PROCEDURE — 82803 BLOOD GASES ANY COMBINATION: CPT | Mod: 91

## 2019-01-01 PROCEDURE — 700105 HCHG RX REV CODE 258

## 2019-01-01 PROCEDURE — 502240 HCHG MISC OR SUPPLY RC 0272: Performed by: INTERNAL MEDICINE

## 2019-01-01 PROCEDURE — 82962 GLUCOSE BLOOD TEST: CPT | Mod: 91

## 2019-01-01 PROCEDURE — 85018 HEMOGLOBIN: CPT

## 2019-01-01 PROCEDURE — 0BCM8ZZ EXTIRPATION OF MATTER FROM BILATERAL LUNGS, VIA NATURAL OR ARTIFICIAL OPENING ENDOSCOPIC: ICD-10-PCS | Performed by: INTERNAL MEDICINE

## 2019-01-01 PROCEDURE — 81001 URINALYSIS AUTO W/SCOPE: CPT

## 2019-01-01 PROCEDURE — 96366 THER/PROPH/DIAG IV INF ADDON: CPT

## 2019-01-01 PROCEDURE — 85027 COMPLETE CBC AUTOMATED: CPT

## 2019-01-01 PROCEDURE — 87040 BLOOD CULTURE FOR BACTERIA: CPT | Mod: 91

## 2019-01-01 PROCEDURE — 30233K1 TRANSFUSION OF NONAUTOLOGOUS FROZEN PLASMA INTO PERIPHERAL VEIN, PERCUTANEOUS APPROACH: ICD-10-PCS | Performed by: EMERGENCY MEDICINE

## 2019-01-01 PROCEDURE — 99153 MOD SED SAME PHYS/QHP EA: CPT | Performed by: INTERNAL MEDICINE

## 2019-01-01 PROCEDURE — 96375 TX/PRO/DX INJ NEW DRUG ADDON: CPT

## 2019-01-01 PROCEDURE — 84484 ASSAY OF TROPONIN QUANT: CPT

## 2019-01-01 PROCEDURE — 88112 CYTOPATH CELL ENHANCE TECH: CPT

## 2019-01-01 PROCEDURE — 84145 PROCALCITONIN (PCT): CPT

## 2019-01-01 PROCEDURE — 87106 FUNGI IDENTIFICATION YEAST: CPT

## 2019-01-01 PROCEDURE — 51702 INSERT TEMP BLADDER CATH: CPT

## 2019-01-01 PROCEDURE — 85730 THROMBOPLASTIN TIME PARTIAL: CPT

## 2019-01-01 PROCEDURE — P9016 RBC LEUKOCYTES REDUCED: HCPCS | Mod: 91

## 2019-01-01 PROCEDURE — 94760 N-INVAS EAR/PLS OXIMETRY 1: CPT

## 2019-01-01 PROCEDURE — 87389 HIV-1 AG W/HIV-1&-2 AB AG IA: CPT

## 2019-01-01 PROCEDURE — 85576 BLOOD PLATELET AGGREGATION: CPT | Mod: 91

## 2019-01-01 PROCEDURE — 700105 HCHG RX REV CODE 258: Performed by: EMERGENCY MEDICINE

## 2019-01-01 PROCEDURE — 96368 THER/DIAG CONCURRENT INF: CPT

## 2019-01-01 PROCEDURE — P9047 ALBUMIN (HUMAN), 25%, 50ML: HCPCS | Performed by: INTERNAL MEDICINE

## 2019-01-01 PROCEDURE — 160048 HCHG OR STATISTICAL LEVEL 1-5: Performed by: INTERNAL MEDICINE

## 2019-01-01 RX ORDER — POTASSIUM CHLORIDE 14.9 MG/ML
20 INJECTION INTRAVENOUS ONCE
Status: COMPLETED | OUTPATIENT
Start: 2019-01-01 | End: 2019-01-01

## 2019-01-01 RX ORDER — ALBUMIN, HUMAN INJ 5% 5 %
25 SOLUTION INTRAVENOUS ONCE
Status: COMPLETED | OUTPATIENT
Start: 2019-01-01 | End: 2019-01-01

## 2019-01-01 RX ORDER — POLYETHYLENE GLYCOL 3350 17 G/17G
1 POWDER, FOR SOLUTION ORAL
Status: DISCONTINUED | OUTPATIENT
Start: 2019-01-01 | End: 2019-01-01

## 2019-01-01 RX ORDER — MAGNESIUM SULFATE HEPTAHYDRATE 40 MG/ML
4 INJECTION, SOLUTION INTRAVENOUS ONCE
Status: COMPLETED | OUTPATIENT
Start: 2019-01-01 | End: 2019-01-01

## 2019-01-01 RX ORDER — SODIUM CHLORIDE 9 MG/ML
INJECTION, SOLUTION INTRAVENOUS CONTINUOUS
Status: DISCONTINUED | OUTPATIENT
Start: 2019-01-01 | End: 2019-01-01

## 2019-01-01 RX ORDER — ONDANSETRON 2 MG/ML
8 INJECTION INTRAMUSCULAR; INTRAVENOUS EVERY 8 HOURS PRN
Status: DISCONTINUED | OUTPATIENT
Start: 2019-01-01 | End: 2019-01-01 | Stop reason: HOSPADM

## 2019-01-01 RX ORDER — POTASSIUM CHLORIDE 7.45 MG/ML
10 INJECTION INTRAVENOUS ONCE
Status: COMPLETED | OUTPATIENT
Start: 2019-01-01 | End: 2019-01-01

## 2019-01-01 RX ORDER — MORPHINE SULFATE 10 MG/ML
5 INJECTION, SOLUTION INTRAMUSCULAR; INTRAVENOUS
Status: DISCONTINUED | OUTPATIENT
Start: 2019-01-01 | End: 2019-01-01

## 2019-01-01 RX ORDER — CALCIUM CHLORIDE 100 MG/ML
1 INJECTION INTRAVENOUS; INTRAVENTRICULAR ONCE
Status: DISCONTINUED | OUTPATIENT
Start: 2019-01-01 | End: 2019-01-01

## 2019-01-01 RX ORDER — PANTOPRAZOLE SODIUM 40 MG/10ML
80 INJECTION, POWDER, LYOPHILIZED, FOR SOLUTION INTRAVENOUS ONCE
Status: COMPLETED | OUTPATIENT
Start: 2019-01-01 | End: 2019-01-01

## 2019-01-01 RX ORDER — ZINC SULFATE 50(220)MG
220 CAPSULE ORAL DAILY
Status: DISCONTINUED | OUTPATIENT
Start: 2019-01-01 | End: 2019-01-01

## 2019-01-01 RX ORDER — SODIUM CHLORIDE 9 MG/ML
500 INJECTION, SOLUTION INTRAVENOUS
Status: COMPLETED | OUTPATIENT
Start: 2019-01-01 | End: 2019-01-01

## 2019-01-01 RX ORDER — MAGNESIUM SULFATE HEPTAHYDRATE 40 MG/ML
2 INJECTION, SOLUTION INTRAVENOUS ONCE
Status: COMPLETED | OUTPATIENT
Start: 2019-01-01 | End: 2019-01-01

## 2019-01-01 RX ORDER — SODIUM CHLORIDE 9 MG/ML
1000 INJECTION, SOLUTION INTRAVENOUS ONCE
Status: COMPLETED | OUTPATIENT
Start: 2019-01-01 | End: 2019-01-01

## 2019-01-01 RX ORDER — IPRATROPIUM BROMIDE AND ALBUTEROL SULFATE 2.5; .5 MG/3ML; MG/3ML
3 SOLUTION RESPIRATORY (INHALATION)
Status: DISCONTINUED | OUTPATIENT
Start: 2019-01-01 | End: 2019-01-01

## 2019-01-01 RX ORDER — ALBUMIN (HUMAN) 12.5 G/50ML
50 SOLUTION INTRAVENOUS ONCE
Status: COMPLETED | OUTPATIENT
Start: 2019-01-01 | End: 2019-01-01

## 2019-01-01 RX ORDER — SODIUM CHLORIDE 9 MG/ML
500 INJECTION, SOLUTION INTRAVENOUS
Status: DISPENSED | OUTPATIENT
Start: 2019-01-01 | End: 2019-01-01

## 2019-01-01 RX ORDER — DEXMEDETOMIDINE HYDROCHLORIDE 4 UG/ML
.1-1.5 INJECTION, SOLUTION INTRAVENOUS CONTINUOUS
Status: DISCONTINUED | OUTPATIENT
Start: 2019-01-01 | End: 2019-01-01

## 2019-01-01 RX ORDER — LORAZEPAM 2 MG/ML
1 CONCENTRATE ORAL
Status: DISCONTINUED | OUTPATIENT
Start: 2019-01-01 | End: 2019-01-01

## 2019-01-01 RX ORDER — LACTULOSE 10 G/15ML
300 SOLUTION ORAL EVERY 6 HOURS
Status: DISCONTINUED | OUTPATIENT
Start: 2019-01-01 | End: 2019-01-01

## 2019-01-01 RX ORDER — SODIUM CHLORIDE 9 MG/ML
INJECTION, SOLUTION INTRAVENOUS
Status: COMPLETED
Start: 2019-01-01 | End: 2019-01-01

## 2019-01-01 RX ORDER — ATROPINE SULFATE 10 MG/ML
2 SOLUTION/ DROPS OPHTHALMIC EVERY 4 HOURS PRN
Status: DISCONTINUED | OUTPATIENT
Start: 2019-01-01 | End: 2019-01-01 | Stop reason: HOSPADM

## 2019-01-01 RX ORDER — LORAZEPAM 2 MG/ML
1-5 INJECTION INTRAMUSCULAR
Status: DISCONTINUED | OUTPATIENT
Start: 2019-01-01 | End: 2019-01-01 | Stop reason: HOSPADM

## 2019-01-01 RX ORDER — FAMOTIDINE 20 MG/1
20 TABLET, FILM COATED ORAL DAILY
Status: DISCONTINUED | OUTPATIENT
Start: 2019-01-01 | End: 2019-01-01

## 2019-01-01 RX ORDER — ONDANSETRON 4 MG/1
8 TABLET, ORALLY DISINTEGRATING ORAL EVERY 8 HOURS PRN
Status: DISCONTINUED | OUTPATIENT
Start: 2019-01-01 | End: 2019-01-01 | Stop reason: HOSPADM

## 2019-01-01 RX ORDER — MIDAZOLAM HYDROCHLORIDE 1 MG/ML
1-5 INJECTION INTRAMUSCULAR; INTRAVENOUS ONCE
Status: COMPLETED | OUTPATIENT
Start: 2019-01-01 | End: 2019-01-01

## 2019-01-01 RX ORDER — SODIUM CHLORIDE 9 MG/ML
INJECTION, SOLUTION INTRAVENOUS
Status: ACTIVE
Start: 2019-01-01 | End: 2019-01-01

## 2019-01-01 RX ORDER — MORPHINE SULFATE 100 MG/5ML
10 SOLUTION ORAL
Status: DISCONTINUED | OUTPATIENT
Start: 2019-01-01 | End: 2019-01-01

## 2019-01-01 RX ORDER — PANTOPRAZOLE SODIUM 40 MG/10ML
40 INJECTION, POWDER, LYOPHILIZED, FOR SOLUTION INTRAVENOUS 2 TIMES DAILY
Status: DISCONTINUED | OUTPATIENT
Start: 2019-01-01 | End: 2019-01-01

## 2019-01-01 RX ORDER — SODIUM CHLORIDE 9 MG/ML
500 INJECTION, SOLUTION INTRAVENOUS ONCE
Status: COMPLETED | OUTPATIENT
Start: 2019-01-01 | End: 2019-01-01

## 2019-01-01 RX ORDER — MORPHINE SULFATE 10 MG/ML
10 INJECTION, SOLUTION INTRAMUSCULAR; INTRAVENOUS
Status: DISCONTINUED | OUTPATIENT
Start: 2019-01-01 | End: 2019-01-01

## 2019-01-01 RX ORDER — PHENYLEPHRINE HCL IN 0.9% NACL 0.5 MG/5ML
100 SYRINGE (ML) INTRAVENOUS
Status: DISCONTINUED | OUTPATIENT
Start: 2019-01-01 | End: 2019-01-01

## 2019-01-01 RX ORDER — MORPHINE SULFATE 100 MG/5ML
10 SOLUTION ORAL
Status: DISCONTINUED | OUTPATIENT
Start: 2019-01-01 | End: 2019-01-01 | Stop reason: HOSPADM

## 2019-01-01 RX ORDER — BISACODYL 10 MG
10 SUPPOSITORY, RECTAL RECTAL
Status: DISCONTINUED | OUTPATIENT
Start: 2019-01-01 | End: 2019-01-01

## 2019-01-01 RX ORDER — THIAMINE MONONITRATE (VIT B1) 100 MG
100 TABLET ORAL DAILY
Status: DISCONTINUED | OUTPATIENT
Start: 2019-01-01 | End: 2019-01-01

## 2019-01-01 RX ORDER — MORPHINE SULFATE 10 MG/ML
5 INJECTION, SOLUTION INTRAMUSCULAR; INTRAVENOUS
Status: DISCONTINUED | OUTPATIENT
Start: 2019-01-01 | End: 2019-01-01 | Stop reason: HOSPADM

## 2019-01-01 RX ORDER — AMOXICILLIN 250 MG
2 CAPSULE ORAL 2 TIMES DAILY
Status: DISCONTINUED | OUTPATIENT
Start: 2019-01-01 | End: 2019-01-01

## 2019-01-01 RX ORDER — MIDAZOLAM HYDROCHLORIDE 1 MG/ML
INJECTION INTRAMUSCULAR; INTRAVENOUS
Status: ACTIVE
Start: 2019-01-01 | End: 2019-01-01

## 2019-01-01 RX ORDER — MORPHINE SULFATE 10 MG/ML
15 INJECTION, SOLUTION INTRAMUSCULAR; INTRAVENOUS
Status: DISCONTINUED | OUTPATIENT
Start: 2019-01-01 | End: 2019-01-01 | Stop reason: HOSPADM

## 2019-01-01 RX ORDER — LACTULOSE 20 G/30ML
30 SOLUTION ORAL 3 TIMES DAILY
Status: DISCONTINUED | OUTPATIENT
Start: 2019-01-01 | End: 2019-01-01

## 2019-01-01 RX ORDER — MAGNESIUM SULFATE HEPTAHYDRATE 40 MG/ML
2 INJECTION, SOLUTION INTRAVENOUS EVERY 6 HOURS
Status: DISCONTINUED | OUTPATIENT
Start: 2019-01-01 | End: 2019-01-01

## 2019-01-01 RX ORDER — LORAZEPAM 2 MG/ML
1-5 CONCENTRATE ORAL
Status: DISCONTINUED | OUTPATIENT
Start: 2019-01-01 | End: 2019-01-01 | Stop reason: HOSPADM

## 2019-01-01 RX ORDER — LORAZEPAM 2 MG/ML
1 INJECTION INTRAMUSCULAR
Status: DISCONTINUED | OUTPATIENT
Start: 2019-01-01 | End: 2019-01-01

## 2019-01-01 RX ORDER — DEXTROSE MONOHYDRATE 25 G/50ML
25 INJECTION, SOLUTION INTRAVENOUS
Status: DISCONTINUED | OUTPATIENT
Start: 2019-01-01 | End: 2019-01-01

## 2019-01-01 RX ORDER — NOREPINEPHRINE BITARTRATE 1 MG/ML
INJECTION, SOLUTION INTRAVENOUS
Status: ACTIVE
Start: 2019-01-01 | End: 2019-01-01

## 2019-01-01 RX ORDER — FOLIC ACID 1 MG/1
1 TABLET ORAL DAILY
Status: DISCONTINUED | OUTPATIENT
Start: 2019-01-01 | End: 2019-01-01

## 2019-01-01 RX ADMIN — IPRATROPIUM BROMIDE AND ALBUTEROL SULFATE 3 ML: .5; 3 SOLUTION RESPIRATORY (INHALATION) at 02:31

## 2019-01-01 RX ADMIN — MORPHINE SULFATE 15 MG: 10 INJECTION INTRAVENOUS at 06:12

## 2019-01-01 RX ADMIN — DEXMEDETOMIDINE HYDROCHLORIDE 0.5 MCG/KG/HR: 100 INJECTION, SOLUTION INTRAVENOUS at 10:43

## 2019-01-01 RX ADMIN — Medication 100 MCG: at 00:45

## 2019-01-01 RX ADMIN — POTASSIUM CHLORIDE 10 MEQ: 7.46 INJECTION, SOLUTION INTRAVENOUS at 20:30

## 2019-01-01 RX ADMIN — LORAZEPAM 1 MG: 2 INJECTION INTRAMUSCULAR at 14:10

## 2019-01-01 RX ADMIN — MORPHINE SULFATE 10 MG: 10 INJECTION INTRAVENOUS at 08:01

## 2019-01-01 RX ADMIN — SODIUM BICARBONATE: 84 INJECTION, SOLUTION INTRAVENOUS at 21:02

## 2019-01-01 RX ADMIN — OCTREOTIDE ACETATE 50 MCG/HR: 200 INJECTION, SOLUTION INTRAVENOUS; SUBCUTANEOUS at 14:58

## 2019-01-01 RX ADMIN — LACTULOSE 300 ML: 10 SOLUTION ORAL at 13:30

## 2019-01-01 RX ADMIN — DEXMEDETOMIDINE HYDROCHLORIDE 1 MCG/KG/HR: 100 INJECTION, SOLUTION INTRAVENOUS at 03:28

## 2019-01-01 RX ADMIN — MORPHINE SULFATE 15 MG: 10 INJECTION INTRAVENOUS at 11:23

## 2019-01-01 RX ADMIN — METRONIDAZOLE 500 MG: 500 INJECTION, SOLUTION INTRAVENOUS at 22:01

## 2019-01-01 RX ADMIN — MORPHINE SULFATE 10 MG: 10 INJECTION INTRAVENOUS at 01:20

## 2019-01-01 RX ADMIN — POTASSIUM CHLORIDE 10 MEQ: 7.46 INJECTION, SOLUTION INTRAVENOUS at 06:47

## 2019-01-01 RX ADMIN — IPRATROPIUM BROMIDE AND ALBUTEROL SULFATE 3 ML: .5; 3 SOLUTION RESPIRATORY (INHALATION) at 11:19

## 2019-01-01 RX ADMIN — LACTULOSE 30 ML: 20 SOLUTION ORAL at 13:12

## 2019-01-01 RX ADMIN — POTASSIUM CHLORIDE 10 MEQ: 7.46 INJECTION, SOLUTION INTRAVENOUS at 09:26

## 2019-01-01 RX ADMIN — IPRATROPIUM BROMIDE AND ALBUTEROL SULFATE 3 ML: .5; 3 SOLUTION RESPIRATORY (INHALATION) at 02:21

## 2019-01-01 RX ADMIN — LORAZEPAM 1 MG: 2 INJECTION INTRAMUSCULAR at 05:53

## 2019-01-01 RX ADMIN — LORAZEPAM 1 MG: 2 INJECTION INTRAMUSCULAR at 17:56

## 2019-01-01 RX ADMIN — PANTOPRAZOLE SODIUM 40 MG: 40 INJECTION, POWDER, LYOPHILIZED, FOR SOLUTION INTRAVENOUS at 17:19

## 2019-01-01 RX ADMIN — LACTULOSE 30 ML: 20 SOLUTION ORAL at 13:27

## 2019-01-01 RX ADMIN — LORAZEPAM 1 MG: 2 INJECTION INTRAMUSCULAR at 01:21

## 2019-01-01 RX ADMIN — SODIUM CHLORIDE: 9 INJECTION, SOLUTION INTRAVENOUS at 19:51

## 2019-01-01 RX ADMIN — POTASSIUM PHOSPHATE, MONOBASIC AND POTASSIUM PHOSPHATE, DIBASIC 30 MMOL: 224; 236 INJECTION, SOLUTION INTRAVENOUS at 09:41

## 2019-01-01 RX ADMIN — IPRATROPIUM BROMIDE AND ALBUTEROL SULFATE 3 ML: .5; 3 SOLUTION RESPIRATORY (INHALATION) at 22:59

## 2019-01-01 RX ADMIN — MORPHINE SULFATE 10 MG: 10 INJECTION INTRAVENOUS at 09:38

## 2019-01-01 RX ADMIN — SODIUM BICARBONATE: 84 INJECTION, SOLUTION INTRAVENOUS at 05:59

## 2019-01-01 RX ADMIN — IPRATROPIUM BROMIDE AND ALBUTEROL SULFATE 3 ML: .5; 3 SOLUTION RESPIRATORY (INHALATION) at 11:53

## 2019-01-01 RX ADMIN — SODIUM CHLORIDE: 9 INJECTION, SOLUTION INTRAVENOUS at 03:41

## 2019-01-01 RX ADMIN — SODIUM CHLORIDE: 9 INJECTION, SOLUTION INTRAVENOUS at 23:55

## 2019-01-01 RX ADMIN — Medication 220 MG: at 13:26

## 2019-01-01 RX ADMIN — MORPHINE SULFATE 15 MG: 10 INJECTION INTRAVENOUS at 13:00

## 2019-01-01 RX ADMIN — MAGNESIUM SULFATE IN WATER 2 G: 40 INJECTION, SOLUTION INTRAVENOUS at 10:56

## 2019-01-01 RX ADMIN — Medication 220 MG: at 05:23

## 2019-01-01 RX ADMIN — OCTREOTIDE ACETATE 50 MCG/HR: 200 INJECTION, SOLUTION INTRAVENOUS; SUBCUTANEOUS at 16:19

## 2019-01-01 RX ADMIN — IPRATROPIUM BROMIDE AND ALBUTEROL SULFATE 3 ML: .5; 3 SOLUTION RESPIRATORY (INHALATION) at 06:54

## 2019-01-01 RX ADMIN — NOREPINEPHRINE BITARTRATE 10 MCG/MIN: 1 INJECTION INTRAVENOUS at 03:03

## 2019-01-01 RX ADMIN — LACTULOSE 300 ML: 10 SOLUTION ORAL at 01:20

## 2019-01-01 RX ADMIN — MAGNESIUM SULFATE IN WATER 2 G: 40 INJECTION, SOLUTION INTRAVENOUS at 07:59

## 2019-01-01 RX ADMIN — Medication 100 MG: at 05:23

## 2019-01-01 RX ADMIN — POTASSIUM CHLORIDE 20 MEQ: 14.9 INJECTION, SOLUTION INTRAVENOUS at 18:50

## 2019-01-01 RX ADMIN — IPRATROPIUM BROMIDE AND ALBUTEROL SULFATE 3 ML: .5; 3 SOLUTION RESPIRATORY (INHALATION) at 07:22

## 2019-01-01 RX ADMIN — IPRATROPIUM BROMIDE AND ALBUTEROL SULFATE 3 ML: .5; 3 SOLUTION RESPIRATORY (INHALATION) at 21:18

## 2019-01-01 RX ADMIN — IPRATROPIUM BROMIDE AND ALBUTEROL SULFATE 3 ML: .5; 3 SOLUTION RESPIRATORY (INHALATION) at 16:32

## 2019-01-01 RX ADMIN — MORPHINE SULFATE 10 MG: 10 INJECTION INTRAVENOUS at 06:00

## 2019-01-01 RX ADMIN — METRONIDAZOLE 500 MG: 500 INJECTION, SOLUTION INTRAVENOUS at 20:31

## 2019-01-01 RX ADMIN — OCTREOTIDE ACETATE 50 MCG/HR: 200 INJECTION, SOLUTION INTRAVENOUS; SUBCUTANEOUS at 18:49

## 2019-01-01 RX ADMIN — VASOPRESSIN 0.03 UNITS/MIN: 20 INJECTION INTRAVENOUS at 21:15

## 2019-01-01 RX ADMIN — IPRATROPIUM BROMIDE AND ALBUTEROL SULFATE 3 ML: .5; 3 SOLUTION RESPIRATORY (INHALATION) at 11:04

## 2019-01-01 RX ADMIN — IPRATROPIUM BROMIDE AND ALBUTEROL SULFATE 3 ML: .5; 3 SOLUTION RESPIRATORY (INHALATION) at 22:14

## 2019-01-01 RX ADMIN — LACTULOSE 30 ML: 20 SOLUTION ORAL at 17:46

## 2019-01-01 RX ADMIN — LORAZEPAM 1 MG: 2 INJECTION INTRAMUSCULAR at 20:31

## 2019-01-01 RX ADMIN — ALBUMIN (HUMAN) 25 G: 2.5 SOLUTION INTRAVENOUS at 11:12

## 2019-01-01 RX ADMIN — LORAZEPAM 1 MG: 2 INJECTION INTRAMUSCULAR at 11:54

## 2019-01-01 RX ADMIN — LORAZEPAM 1 MG: 2 INJECTION INTRAMUSCULAR at 02:52

## 2019-01-01 RX ADMIN — RIFAXIMIN 550 MG: 550 TABLET ORAL at 17:15

## 2019-01-01 RX ADMIN — POTASSIUM CHLORIDE 20 MEQ: 14.9 INJECTION, SOLUTION INTRAVENOUS at 00:52

## 2019-01-01 RX ADMIN — FENTANYL CITRATE 100 MCG: 50 INJECTION, SOLUTION INTRAMUSCULAR; INTRAVENOUS at 09:45

## 2019-01-01 RX ADMIN — MORPHINE SULFATE 15 MG: 10 INJECTION INTRAVENOUS at 15:26

## 2019-01-01 RX ADMIN — PROPOFOL 20 MCG/KG/MIN: 10 INJECTION, EMULSION INTRAVENOUS at 18:30

## 2019-01-01 RX ADMIN — FENTANYL CITRATE 50 MCG/HR: 50 INJECTION, SOLUTION INTRAMUSCULAR; INTRAVENOUS at 21:57

## 2019-01-01 RX ADMIN — IPRATROPIUM BROMIDE AND ALBUTEROL SULFATE 3 ML: .5; 3 SOLUTION RESPIRATORY (INHALATION) at 02:57

## 2019-01-01 RX ADMIN — FENTANYL CITRATE 100 MCG: 50 INJECTION, SOLUTION INTRAMUSCULAR; INTRAVENOUS at 10:40

## 2019-01-01 RX ADMIN — MORPHINE SULFATE 15 MG: 10 INJECTION INTRAVENOUS at 00:59

## 2019-01-01 RX ADMIN — LACTULOSE 300 ML: 10 SOLUTION ORAL at 18:23

## 2019-01-01 RX ADMIN — PROPOFOL 40 MCG/KG/MIN: 10 INJECTION, EMULSION INTRAVENOUS at 06:43

## 2019-01-01 RX ADMIN — PROPOFOL 10 MCG/KG/MIN: 10 INJECTION, EMULSION INTRAVENOUS at 18:08

## 2019-01-01 RX ADMIN — SODIUM CHLORIDE: 9 INJECTION, SOLUTION INTRAVENOUS at 06:27

## 2019-01-01 RX ADMIN — LORAZEPAM 1 MG: 2 INJECTION INTRAMUSCULAR at 23:09

## 2019-01-01 RX ADMIN — SODIUM CHLORIDE 1000 ML: 9 INJECTION, SOLUTION INTRAVENOUS at 14:00

## 2019-01-01 RX ADMIN — LORAZEPAM 1 MG: 2 INJECTION INTRAMUSCULAR at 13:03

## 2019-01-01 RX ADMIN — FOLIC ACID: 5 INJECTION, SOLUTION INTRAMUSCULAR; INTRAVENOUS; SUBCUTANEOUS at 08:36

## 2019-01-01 RX ADMIN — MIDAZOLAM HYDROCHLORIDE 3 MG: 1 INJECTION, SOLUTION INTRAMUSCULAR; INTRAVENOUS at 15:15

## 2019-01-01 RX ADMIN — SODIUM CHLORIDE 8 MG/HR: 9 INJECTION, SOLUTION INTRAVENOUS at 14:37

## 2019-01-01 RX ADMIN — LORAZEPAM 4 MG: 2 INJECTION INTRAMUSCULAR at 12:38

## 2019-01-01 RX ADMIN — ALBUMIN (HUMAN) 50 G: 5 SOLUTION INTRAVENOUS at 21:33

## 2019-01-01 RX ADMIN — MORPHINE SULFATE 5 MG: 10 INJECTION INTRAVENOUS at 20:46

## 2019-01-01 RX ADMIN — MORPHINE SULFATE 5 MG: 10 INJECTION INTRAVENOUS at 20:26

## 2019-01-01 RX ADMIN — METRONIDAZOLE 500 MG: 500 INJECTION, SOLUTION INTRAVENOUS at 13:27

## 2019-01-01 RX ADMIN — PROPOFOL 10 MCG/KG/MIN: 10 INJECTION, EMULSION INTRAVENOUS at 13:10

## 2019-01-01 RX ADMIN — METRONIDAZOLE 500 MG: 500 INJECTION, SOLUTION INTRAVENOUS at 05:56

## 2019-01-01 RX ADMIN — PROPOFOL 40 MCG/KG/MIN: 10 INJECTION, EMULSION INTRAVENOUS at 00:51

## 2019-01-01 RX ADMIN — LORAZEPAM 1 MG: 2 INJECTION INTRAMUSCULAR at 22:29

## 2019-01-01 RX ADMIN — PANTOPRAZOLE SODIUM 40 MG: 40 INJECTION, POWDER, LYOPHILIZED, FOR SOLUTION INTRAVENOUS at 05:26

## 2019-01-01 RX ADMIN — LORAZEPAM 4 MG: 2 INJECTION INTRAMUSCULAR at 11:04

## 2019-01-01 RX ADMIN — MORPHINE SULFATE 10 MG: 10 INJECTION INTRAVENOUS at 10:03

## 2019-01-01 RX ADMIN — POTASSIUM CHLORIDE 10 MEQ: 7.46 INJECTION, SOLUTION INTRAVENOUS at 00:59

## 2019-01-01 RX ADMIN — POTASSIUM CHLORIDE 20 MEQ: 14.9 INJECTION, SOLUTION INTRAVENOUS at 06:44

## 2019-01-01 RX ADMIN — POTASSIUM CHLORIDE 20 MEQ: 14.9 INJECTION, SOLUTION INTRAVENOUS at 00:59

## 2019-01-01 RX ADMIN — METRONIDAZOLE 500 MG: 500 INJECTION, SOLUTION INTRAVENOUS at 05:23

## 2019-01-01 RX ADMIN — Medication 100 MCG: at 00:57

## 2019-01-01 RX ADMIN — THERA TABS 1 TABLET: TAB at 05:24

## 2019-01-01 RX ADMIN — LORAZEPAM 1 MG: 2 INJECTION INTRAMUSCULAR at 10:47

## 2019-01-01 RX ADMIN — SODIUM CHLORIDE 8 MG/HR: 9 INJECTION, SOLUTION INTRAVENOUS at 09:54

## 2019-01-01 RX ADMIN — POTASSIUM CHLORIDE 10 MEQ: 7.46 INJECTION, SOLUTION INTRAVENOUS at 17:15

## 2019-01-01 RX ADMIN — SODIUM CHLORIDE: 9 INJECTION, SOLUTION INTRAVENOUS at 01:45

## 2019-01-01 RX ADMIN — LACTULOSE 30 ML: 20 SOLUTION ORAL at 05:24

## 2019-01-01 RX ADMIN — DEXMEDETOMIDINE HYDROCHLORIDE 0.5 MCG/KG/HR: 100 INJECTION, SOLUTION INTRAVENOUS at 20:30

## 2019-01-01 RX ADMIN — MORPHINE SULFATE 15 MG: 10 INJECTION INTRAVENOUS at 17:56

## 2019-01-01 RX ADMIN — CEFTRIAXONE SODIUM 1 G: 1 INJECTION, POWDER, FOR SOLUTION INTRAMUSCULAR; INTRAVENOUS at 19:24

## 2019-01-01 RX ADMIN — LORAZEPAM 1 MG: 2 INJECTION INTRAMUSCULAR at 10:14

## 2019-01-01 RX ADMIN — CALCIUM CHLORIDE 1 G: 100 INJECTION, SOLUTION INTRAVENOUS at 06:33

## 2019-01-01 RX ADMIN — LACTULOSE 300 ML: 10 SOLUTION ORAL at 06:48

## 2019-01-01 RX ADMIN — METRONIDAZOLE 500 MG: 500 INJECTION, SOLUTION INTRAVENOUS at 05:06

## 2019-01-01 RX ADMIN — IPRATROPIUM BROMIDE AND ALBUTEROL SULFATE 3 ML: .5; 3 SOLUTION RESPIRATORY (INHALATION) at 18:16

## 2019-01-01 RX ADMIN — IPRATROPIUM BROMIDE AND ALBUTEROL SULFATE 3 ML: .5; 3 SOLUTION RESPIRATORY (INHALATION) at 07:24

## 2019-01-01 RX ADMIN — METRONIDAZOLE 500 MG: 500 INJECTION, SOLUTION INTRAVENOUS at 20:30

## 2019-01-01 RX ADMIN — CEFTRIAXONE SODIUM 1 G: 1 INJECTION, POWDER, FOR SOLUTION INTRAMUSCULAR; INTRAVENOUS at 05:25

## 2019-01-01 RX ADMIN — POTASSIUM CHLORIDE 10 MEQ: 7.46 INJECTION, SOLUTION INTRAVENOUS at 06:25

## 2019-01-01 RX ADMIN — FENTANYL CITRATE 100 MCG: 50 INJECTION, SOLUTION INTRAMUSCULAR; INTRAVENOUS at 15:17

## 2019-01-01 RX ADMIN — MORPHINE SULFATE 10 MG: 10 INJECTION INTRAVENOUS at 02:49

## 2019-01-01 RX ADMIN — POTASSIUM CHLORIDE 20 MEQ: 14.9 INJECTION, SOLUTION INTRAVENOUS at 13:53

## 2019-01-01 RX ADMIN — METRONIDAZOLE 500 MG: 500 INJECTION, SOLUTION INTRAVENOUS at 13:11

## 2019-01-01 RX ADMIN — POTASSIUM CHLORIDE 10 MEQ: 7.46 INJECTION, SOLUTION INTRAVENOUS at 14:58

## 2019-01-01 RX ADMIN — MORPHINE SULFATE 10 MG: 10 INJECTION INTRAVENOUS at 22:26

## 2019-01-01 RX ADMIN — FENTANYL CITRATE 50 MCG/HR: 50 INJECTION, SOLUTION INTRAMUSCULAR; INTRAVENOUS at 13:27

## 2019-01-01 RX ADMIN — CEFTRIAXONE SODIUM 1 G: 1 INJECTION, POWDER, FOR SOLUTION INTRAMUSCULAR; INTRAVENOUS at 05:03

## 2019-01-01 RX ADMIN — POTASSIUM CHLORIDE 20 MEQ: 14.9 INJECTION, SOLUTION INTRAVENOUS at 20:30

## 2019-01-01 RX ADMIN — LACTULOSE 30 ML: 20 SOLUTION ORAL at 17:15

## 2019-01-01 RX ADMIN — PANTOPRAZOLE SODIUM 40 MG: 40 INJECTION, POWDER, LYOPHILIZED, FOR SOLUTION INTRAVENOUS at 17:46

## 2019-01-01 RX ADMIN — LORAZEPAM 1 MG: 2 INJECTION INTRAMUSCULAR at 04:53

## 2019-01-01 RX ADMIN — MORPHINE SULFATE 15 MG: 10 INJECTION INTRAVENOUS at 04:16

## 2019-01-01 RX ADMIN — METRONIDAZOLE 500 MG: 500 INJECTION, SOLUTION INTRAVENOUS at 15:12

## 2019-01-01 RX ADMIN — LACTULOSE 300 ML: 10 SOLUTION ORAL at 00:08

## 2019-01-01 RX ADMIN — IPRATROPIUM BROMIDE AND ALBUTEROL SULFATE 3 ML: .5; 3 SOLUTION RESPIRATORY (INHALATION) at 16:24

## 2019-01-01 RX ADMIN — MORPHINE SULFATE 15 MG: 10 INJECTION INTRAVENOUS at 16:39

## 2019-01-01 RX ADMIN — SODIUM CHLORIDE: 9 INJECTION, SOLUTION INTRAVENOUS at 10:35

## 2019-01-01 RX ADMIN — SODIUM CHLORIDE 500 ML: 9 INJECTION, SOLUTION INTRAVENOUS at 11:33

## 2019-01-01 RX ADMIN — SODIUM CHLORIDE: 9 INJECTION, SOLUTION INTRAVENOUS at 18:35

## 2019-01-01 RX ADMIN — LORAZEPAM 1 MG: 2 INJECTION INTRAMUSCULAR at 08:01

## 2019-01-01 RX ADMIN — MORPHINE SULFATE 15 MG: 10 INJECTION INTRAVENOUS at 21:52

## 2019-01-01 RX ADMIN — NOREPINEPHRINE BITARTRATE 15 MCG/MIN: 1 INJECTION INTRAVENOUS at 10:45

## 2019-01-01 RX ADMIN — LORAZEPAM 1 MG: 2 INJECTION INTRAMUSCULAR at 06:03

## 2019-01-01 RX ADMIN — MAGNESIUM SULFATE IN WATER 4 G: 40 INJECTION, SOLUTION INTRAVENOUS at 11:31

## 2019-01-01 RX ADMIN — IPRATROPIUM BROMIDE AND ALBUTEROL SULFATE 3 ML: .5; 3 SOLUTION RESPIRATORY (INHALATION) at 22:33

## 2019-01-01 RX ADMIN — VASOPRESSIN 0.03 UNITS/MIN: 20 INJECTION INTRAVENOUS at 10:45

## 2019-01-01 RX ADMIN — NOREPINEPHRINE BITARTRATE 15 MCG/MIN: 1 INJECTION INTRAVENOUS at 14:28

## 2019-01-01 RX ADMIN — LORAZEPAM 1 MG: 2 INJECTION INTRAMUSCULAR at 15:26

## 2019-01-01 RX ADMIN — MORPHINE SULFATE 10 MG: 10 INJECTION INTRAVENOUS at 10:47

## 2019-01-01 RX ADMIN — RIFAXIMIN 550 MG: 550 TABLET ORAL at 05:23

## 2019-01-01 RX ADMIN — FOLIC ACID 1 MG: 1 TABLET ORAL at 05:24

## 2019-01-01 RX ADMIN — MORPHINE SULFATE 10 MG: 10 INJECTION INTRAVENOUS at 04:49

## 2019-01-01 RX ADMIN — LORAZEPAM 1 MG: 2 INJECTION INTRAMUSCULAR at 20:55

## 2019-01-01 RX ADMIN — VASOPRESSIN 0.03 UNITS/MIN: 20 INJECTION INTRAVENOUS at 09:30

## 2019-01-01 RX ADMIN — POTASSIUM CHLORIDE 20 MEQ: 14.9 INJECTION, SOLUTION INTRAVENOUS at 13:40

## 2019-01-01 RX ADMIN — PANTOPRAZOLE SODIUM 40 MG: 40 INJECTION, POWDER, LYOPHILIZED, FOR SOLUTION INTRAVENOUS at 09:40

## 2019-01-01 RX ADMIN — CEFTRIAXONE SODIUM 1 G: 1 INJECTION, POWDER, FOR SOLUTION INTRAMUSCULAR; INTRAVENOUS at 12:23

## 2019-01-01 RX ADMIN — SODIUM CHLORIDE 8 MG/HR: 9 INJECTION, SOLUTION INTRAVENOUS at 20:56

## 2019-01-01 RX ADMIN — LACTULOSE 300 ML: 10 SOLUTION ORAL at 05:00

## 2019-01-01 RX ADMIN — LORAZEPAM 1 MG: 2 INJECTION INTRAMUSCULAR at 16:39

## 2019-01-01 RX ADMIN — PANTOPRAZOLE SODIUM 80 MG: 40 INJECTION, POWDER, LYOPHILIZED, FOR SOLUTION INTRAVENOUS at 14:15

## 2019-01-01 RX ADMIN — SODIUM CHLORIDE 1000 ML: 9 INJECTION, SOLUTION INTRAVENOUS at 01:15

## 2019-01-01 RX ADMIN — MORPHINE SULFATE 10 MG: 10 INJECTION INTRAVENOUS at 11:54

## 2019-01-01 RX ADMIN — RIFAXIMIN 550 MG: 550 TABLET ORAL at 13:26

## 2019-01-01 RX ADMIN — CALCIUM GLUCONATE 1 G: 98 INJECTION, SOLUTION INTRAVENOUS at 01:56

## 2019-01-01 RX ADMIN — SODIUM CHLORIDE 1 ML: 9 INJECTION, SOLUTION INTRAVENOUS at 20:48

## 2019-01-01 RX ADMIN — MORPHINE SULFATE 15 MG: 10 INJECTION INTRAVENOUS at 14:09

## 2019-01-01 RX ADMIN — SODIUM CHLORIDE 8 MG/HR: 9 INJECTION, SOLUTION INTRAVENOUS at 01:02

## 2019-01-01 RX ADMIN — POTASSIUM PHOSPHATE, MONOBASIC AND POTASSIUM PHOSPHATE, DIBASIC 30 MMOL: 224; 236 INJECTION, SOLUTION INTRAVENOUS at 10:56

## 2019-01-01 RX ADMIN — SODIUM CHLORIDE 1000 ML: 9 INJECTION, SOLUTION INTRAVENOUS at 20:45

## 2019-01-01 RX ADMIN — IPRATROPIUM BROMIDE AND ALBUTEROL SULFATE 3 ML: .5; 3 SOLUTION RESPIRATORY (INHALATION) at 18:34

## 2019-01-01 RX ADMIN — POTASSIUM CHLORIDE 20 MEQ: 14.9 INJECTION, SOLUTION INTRAVENOUS at 07:59

## 2019-01-01 RX ADMIN — LORAZEPAM 1 MG: 2 INJECTION INTRAMUSCULAR at 09:41

## 2019-01-01 RX ADMIN — LORAZEPAM 1 MG: 2 INJECTION INTRAMUSCULAR at 19:24

## 2019-01-01 RX ADMIN — FOLIC ACID: 5 INJECTION, SOLUTION INTRAMUSCULAR; INTRAVENOUS; SUBCUTANEOUS at 09:40

## 2019-01-01 RX ADMIN — Medication 100 MCG: at 00:30

## 2019-01-01 ASSESSMENT — ENCOUNTER SYMPTOMS
RESPIRATORY NEGATIVE: 1
CARDIOVASCULAR NEGATIVE: 1
GASTROINTESTINAL NEGATIVE: 1
WEAKNESS: 1

## 2019-01-01 ASSESSMENT — PAIN SCALES - GENERAL
PAINLEVEL_OUTOF10: 0

## 2019-01-03 PROBLEM — R57.1 HYPOVOLEMIC SHOCK (HCC): Status: ACTIVE | Noted: 2019-01-01

## 2019-01-03 PROBLEM — K72.10 END STAGE LIVER DISEASE (HCC): Status: ACTIVE | Noted: 2019-01-01

## 2019-01-03 PROBLEM — F10.21 HISTORY OF ALCOHOL DEPENDENCE (HCC): Status: ACTIVE | Noted: 2019-01-01

## 2019-01-03 PROBLEM — F17.200 TOBACCO DEPENDENCE: Status: ACTIVE | Noted: 2019-01-01

## 2019-01-03 PROBLEM — J96.01 ACUTE RESPIRATORY FAILURE WITH HYPOXIA (HCC): Status: ACTIVE | Noted: 2019-01-01

## 2019-01-03 PROBLEM — K92.2 GI BLEEDING: Status: ACTIVE | Noted: 2019-01-01

## 2019-01-03 PROBLEM — D62 ACUTE BLOOD LOSS ANEMIA: Status: ACTIVE | Noted: 2019-01-01

## 2019-01-03 PROBLEM — G92.8 TOXIC METABOLIC ENCEPHALOPATHY: Status: ACTIVE | Noted: 2019-01-01

## 2019-01-03 NOTE — ED PROVIDER NOTES
"ED Provider Note    ED Provider Note    Primary care provider: No primary care provider on file.  Means of arrival: EMS  History obtained from: Patient    CHIEF COMPLAINT  Chief Complaint   Patient presents with   • Lower GI Bleed     Seen at 1:43 PM.   HPI  Filiberto Rahman is a 58 y.o. male who presents to the Emergency Department with altered mental status and apparent GI bleed.  The history is not entirely clear.  The patient's roommate contacted EMS.  They arrived to find the patient minimally responsive, hypotensive with a copious amount of hematemesis on the floor as well as melena.    Further history is limited due to patient condition.    REVIEW OF SYSTEMS  See HPI, unable to obtain any additional review of systems.  PAST MEDICAL HISTORY   Liver disease    SURGICAL HISTORY  patient denies any surgical history    SOCIAL HISTORY  Social History   Substance Use Topics   • Smoking status: Not on file   • Smokeless tobacco: Not on file   • Alcohol use Not on file      History   Drug use: Unknown       FAMILY HISTORY  No family history on file.    CURRENT MEDICATIONS  Reviewed.  See Encounter Summary.     ALLERGIES  No Known Allergies    PHYSICAL EXAM  VITAL SIGNS: /66   Pulse 96   Temp 36.3 °C (97.4 °F) (Temporal)   Resp 20   Ht 1.778 m (5' 10\")   Wt 58.5 kg (128 lb 15.5 oz)   SpO2 100%   BMI 18.51 kg/m²   Constitutional: Awake, states name.  Emaciated, ill-appearing.  HENT: Normocephalic, atraumatic, bilateral external ears normal. Nose normal.   Eyes: icteric sclera, PERRLA  Thorax & Lungs:  Clear to ascultation bilaterally.  Cardiovascular: Tachycardic   abdomen:  Soft, nondistended, normal active bowel sounds.   Copious melena dried on the patient's legs.  :    Skin: Visualized skin is  Dry, No erythema, No rash.   Musculoskeletal:   No cyanosis, clubbing or edema.  Neurologic: Minimally responsive, does appear to move all 4 extremities.  Pupils are equal and reactive bilaterally.  Psychiatric: " Unable to assess.  Lymphatic:  No cervical LAD    RADIOLOGY  DX-CHEST-PORTABLE (1 VIEW)   Final Result      Endotracheal tube and right IJ central line as described above.      Hypoventilatory change.      CT-HEAD W/O    (Results Pending)   CT-RENAL COLIC EVALUATION(A/P W/O)    (Results Pending)   EC-ECHOCARDIOGRAM COMPLETE W/O CONT    (Results Pending)   DX-CHEST-PORTABLE (1 VIEW)    (Results Pending)         COURSE & MEDICAL DECISION MAKING  Pertinent Labs & Imaging studies reviewed. (See chart for details)    Differential diagnoses include but are not limited to: End-stage liver disease, GI bleed, sepsis    1:43 PM - Medical record reviewed, patient seen and examined at bedside.  No medical records are available.    3:08 PM -INR of almost 5.  Plan to give 2 units of FFP.  First unit of unmatched blood actively being transfused.  Octreotide will be ordered as well.    3:30 PM-the patient's cousin and daughter are here at bedside.  They do not know any significant mass medical history, they do note that he has lost a lot of weight over the past year.  Apparently he sees a physician up in [place] and was told he had some type of liver disease.  The last time they saw him was Thanksgiving when he was not well-appearing.    3:52 PM -GI paged.    3:58 PM - Discussed with Dr. Da Silva (GI)    3:58 PM- paged Dr. Mendoza-discussed the case with the hospitalist who will evaluate the patient for admission.    4:49 PM- PMA paged.  Case discussed with Dr. Gonsalez    Decision Making:  This is a 58 y.o. year old male who presents hypotensive, tachycardic with obvious GI bleed.  The patient is critically ill.  He is hemodynamically unstable, thus he was given a O+ unmatched blood from the trauma bay while awaiting laboratory evaluation.    INR was significantly elevated at 5.  The patient was transfused FFP. The patient was given Protonix as well as octreotide out of concern for possible esophageal varices.  Patient also  has a severe metabolic acidosis and findings suggestive of hepatorenal syndrome.    At this time he will be admitted in critical condition, given his degree of liver disease prognosis is poor.    CRITICAL CARE  The very real possibilty of a deterioration of this patient's condition required the highest level of my preparedness for sudden, emergent intervention.  I provided critical care services, which included medication orders, frequent reevaluations of the patient's condition and response to treatment, ordering and reviewing test results, and discussing the case with various consultants.  The critical care time associated with the care of the patient was 40 minutes. Review chart for interventions. This time is exclusive of any other billable procedures.               FINAL IMPRESSION  1. Upper GI bleed    2. End stage liver disease (HCC)    3. Hemorrhagic shock (HCC)

## 2019-01-03 NOTE — ED TRIAGE NOTES
Pt bib ems from home pt was found down covered in stool and blood probable gi bleed. Pt arrives hypotensive unknown hx

## 2019-01-04 PROBLEM — E87.6 HYPOKALEMIA: Status: ACTIVE | Noted: 2019-01-01

## 2019-01-04 PROBLEM — J69.0 ASPIRATION PNEUMONIA (HCC): Status: ACTIVE | Noted: 2019-01-01

## 2019-01-04 PROBLEM — F14.10 COCAINE ABUSE (HCC): Status: ACTIVE | Noted: 2019-01-01

## 2019-01-04 PROBLEM — D68.9 COAGULOPATHY (HCC): Status: ACTIVE | Noted: 2019-01-01

## 2019-01-04 PROBLEM — E83.51 HYPOCALCEMIA: Status: ACTIVE | Noted: 2019-01-01

## 2019-01-04 PROBLEM — E83.42 HYPOMAGNESEMIA: Status: ACTIVE | Noted: 2019-01-01

## 2019-01-04 NOTE — CONSULTS
[facility]    DATE OF SERVICE:  01/03/2019    GASTROINTESTINAL CONSULTATION    TIME:  05:26 p.m.    REFERRING PHYSICIAN:  Garland Muñoz MD    GASTROENTEROLOGIST:  Unestablished.    PRIMARY CARE PHYSICIAN:  Unknown.    REASON FOR CONSULTATION:  Hematemesis.    HISTORY OF PRESENT ILLNESS:  This 58-year-old  male, alcoholic,   presents to [facility] after being found down.  Apparently, the last time   anybody saw him coherent and verbal was yesterday in the late afternoon at   approximately 03:00 p.m.  Today, he was found down with blood around him.    Yesterday, he had thrown up then they originally thought it was Root beer, but   in retrospect it may have been coffee-ground hematemesis.  He is currently   somnolent and nonresponsive in the emergency department and cannot provide any   history.  He is also hypotensive and clinically unstable to proceed with   upper endoscopy at this time.  I did speak to patient's cousin to obtain   further history and discuss patient's management and also speak to critical   care physician who plans to intubate the patient for airway protection and   start a central line for better vascular access and fluid resuscitation.    Otherwise, further modifying factors, associated symptoms, or timing issues   are unobtainable from the patient.    PAST MEDICAL HISTORY:  Alcohol-induced liver cirrhosis, history of hemorrhagic   diarrhea from infectious gastroenteritis in [place].    PAST SURGICAL HISTORY:  Unknown.    FAMILY HISTORY:  Unknown.    SOCIAL HISTORY:  Heavy alcoholic, apparently has been trying to decrease his   alcohol intake in the last 9 months.    ALLERGIES:  No known drug allergies.    OUTPATIENT MEDICATIONS:  None.    REVIEW OF SYSTEMS:  Unobtainable due to patient's altered mental status.    PHYSICAL EXAMINATION:  VITAL SIGNS:  Temperature is 96, respirations 20, pulse 105, and blood   pressure is 96/57.  GENERAL:  Somnolent and nonresponsive white male, not  alert and oriented.    Jaundice.  HEENT:  Head is atraumatic and normocephalic.  Eyes, pupils are equally round   and reactive to light.  Icteric sclerae.  Does not follow commands.  NECK:  Supply.  No lymphadenopathy or JVD.  PULMONARY:  Poor respiratory effort.  CARDIOVASCULAR:  Regular rate and rhythm.  ABDOMEN:  Moderate, but not tense ascites.  Positive hepatosplenomegaly.  No   appreciable rebound or tenderness.  Positive bowel sounds.  EXTREMITIES:  No clubbing, cyanosis or edema.  Intact pulses.  NEUROLOGICAL:  Does not follow commands, somnolent.  PSYCHIATRIC:  Nonverbal.  DERMATOLOGIC:  Positive spider angiomas and palmar erythema.    LABORATORY DATA:  Hemoglobin is 6.3, hematocrit 18.7, , and platelet   count 139.  Bicarbonate 8, BUN 50, and creatinine 1.99.  GFR 34, calcium 7.7,   AST 60, ALT 34, total bilirubin 2.4, albumin 2.1, troponin 0.19.  INR is 4.98,   PT is 46.2, and PTT is 51.1.  Blood type is O positive, otherwise normal WBC,   sodium, potassium, chloride, AST, alkaline phosphatase, and nonelevated   lipase of 10.    IMPRESSION:  A 58-year-old  male with alcohol-induced liver   cirrhosis, MELD of 34, presents with hematemesis, anemia from acute blood   loss, hepatic decompensation with coma, elevated troponin, acidosis,   somnolence, coagulopathy and hypotension.    Patient's clinical presentation is most suggestive of portal hypertensive   bleeding such as esophageal varices and/or portal hypertensive gastropathy,   peptic ulcer disease would also be a consideration as well as   gastroduodenitis,  gastrointestinal angiodysplasias, reflux esophagitis versus   others.    Comorbidities are significant including advanced liver disease with ascites   and decompensation with coma, elevated troponin with possibility of myocardial infarction  and/or acute coronary syndrome, coagulopathy, upper gastrointestinal bleeding, anemia from acute blood loss,   acidosis, and as per above these  multiple significant comorbidities increase   the risk for adverse outcomes as well increase the complexity of medical   decision making.    PROBLEMS:  1.  Hematemesis.  2.  Alcohol-induced liver cirrhosis with ascites.  3.  Hepatic decompensation with coma.  4.  Elevated troponin.  5.  Anemia from acute blood loss.  6.  Coagulopathy.  7.  Somnolence, altered mental status.  8.  Multiple comorbidities as per above.    RECOMMENDATIONS:  1.  Agree with IV Protonix and octreotide drips.  2.  Critical care physician has already checked TEG and we will plan to direct   blood product transfusions as required pending those results.  3.  Serial hemoglobin and hematocrit.  Transfuse to goal hemoglobin greater   than 6.  4.  Trend troponin and we will defer EKG and cardiac workup to the   hospitalist.  5.  Keep n.p.o.  6.  It is noted that ICU physician plans to intubate the patient for airway   protection as well as start a central line for a better vascular access.  7.  Too high risk for EGD at this time due to hypotension and somnolence.  He   first needs to be intubated for airway protection and receive blood products   and IV fluid resuscitation and if his clinical status improves we can   potentially pursue EGD with anticipated esophageal varices band ligation,   hemostasis, biopsies and/or endotherapy by Dr. Hansel Da Silva tomorrow.  8.  Agree with treating the patient empirically with Rocephin to cover for   spontaneous bacterial peritonitis, prophylaxis in the setting of   gastrointestinal bleeding and liver cirrhotic.  9.  Alcohol cessation.    Dear Dr. Garland Muñoz and Dr. Edwin Coronado,    Thank you for asking me to evaluate the patient in consultation.  Please refer   to my consult note as per above for details of recommendations.  Please feel   free to call us with any questions.       ____________________________________     MD ROBINSON MCNAMARA / INGRID    DD:  01/03/2019 17:36:46  DT:  01/03/2019  18:11:03    D#:  [id number]  Job#:  [id number]    cc: ANIRUDH PERKINS MD, Edwin Coronado MD

## 2019-01-04 NOTE — CONSULTS
DATE OF SERVICE:  01/03/2019    PULMONARY AND CRITICAL CARE CONSULTATION    REQUESTING PHYSICIAN:  Edwin Coronado MD    REASON FOR REQUEST:  Encephalopathy.    HISTORY OF PRESENT ILLNESS:  This is a 58-year-old gentleman with no known   medical history except for longstanding alcohol use, some form of possible   liver disease and per cousin a significant amount of weight loss over this   past year.  Patient lives apparently in [place] area, has a roommate, was   reportedly seen normal last night.  In and around 3 in the morning, he had   made some noise that the roommate responded to and he subsequently said that   he was okay, but did have apparently some emesis at that time.  Later this   morning, he was found by his roommate lying on the ground with what appeared   to be a large amount of bloody vomitus.  EMS was called and patient was   subsequently brought into [facility] for further evaluation.  At this time, he is   unresponsive, poorly arousable, will open eyes to deep sternal rub, but not   communicate or answer.  His cousin is at bedside, has a very minimal past   medical history and states that he does not divulge much, but has some form of   underlying liver disease.  Patient of note also does spend a moderate amount   of the year in [place] and had perhaps some kind of GI bleed while he was there   last.  She indicates that he was a heavy drinker, but believed that he had   stopped drinking for the last several months.    ALLERGIES:  No known drug allergies.    OUTPATIENT MEDICATIONS:  None are known.    PAST MEDICAL HISTORY:  Unknown.    SOCIAL HISTORY:  Unknown.    FAMILY HISTORY:  Unknown.    REVIEW OF SYSTEMS:  Unable to obtain.    PHYSICAL EXAMINATION:  VITAL SIGNS:  Temperature 97.6, pulse rate 105, blood pressure 96/57, satting   100% on 4 liters.  GENERAL:  Patient appears extremely emaciated, acute on chronically ill.  HEENT:  Normocephalic.  Significant temporal wasting.  Positive scleral    icterus.  Dried bloody secretions in and around lips and mouth.  CARDIOVASCULAR:  Tachycardic rate, regular rhythm.  RESPIRATORY:  Diminished with scattered rhonchi.  ABDOMEN:  Soft, nondistended.  EXTREMITIES:  Extremely wasted.  No edema.  No significant atrophy.  NEUROLOGIC:  Patient poorly responsive, minimally opens eyes to deep sternal   rub, but does not follow.  PSYCHIATRIC:  Unable to assess given clinical state.    LABORATORY DATA:  White count 10.6, H and H of 6 and 18, , platelet   count 139, positive left shift, no bands.  Sodium 140, potassium 4.1, chloride   105, bicarbonate 8, anion gap 27, glucose 111, BUN 50, creatinine 1.99,   calcium 7.7, AST 60, ALT 34, alkaline phosphatase 98, total bilirubin 2.4,   albumin 2.1.  Lipase 10.  Troponin 0.19.  INR 4.98, PTT of 55.    IMAGING:  None at present.    ASSESSMENT:  1.  Acute encephalopathy.  2.  Upper gastrointestinal bleed.  3.  Acute blood loss anemia.  4.  Anion gap metabolic acidosis.  5.  Acute kidney injury.  6.  Macrocytic anemia.  7.  Coagulopathy.  8.  Reported history of liver disease.  9.  Reported history of alcohol abuse and dependency.  10.  Severe protein-calorie malnutrition.  11.  Incomplete database.    PLAN:  Again, this is a 58-year-old gentleman with above-indicated history and   presentation that is currently poorly protecting his airway, will require   intubation and full mechanical ventilatory support, appears that he may have   also aspirated GI contents and will require bronchoscopy.  Patient will be   maintained on full mechanical ventilatory support.  We will adjust ventilator   based on ABG and pulmonary mechanics.  We will also initiate antibiotic   coverage for aspiration.  Patient has been seen by GI for concern of upper GI   bleed, which clinically appears as happened.  Patient will be on Protonix   infusion as well as octreotide infusion.  He will be n.p.o.  No antiplatelet   or anticoagulant therapies.  I will  check a TEG for his underlying   coagulopathy and replace accordingly.  He thus far received 2 units of PRBCs   as well as receiving 2 units of FFP.  GI will likely scope tomorrow if patient   hemodynamically improved.  As for the GI bleed further, we will have serial H   and Hs, continue to transfuse to maintain greater than 8 given his underlying   associated hypotension.  As for his anion gap metabolic acidosis, we will   check a lactate pending ABG to better define his acid base disturbance.  He   will be placed on a higher-minute ventilation for respiratory compensation.    This is likely a combination of lactate as well as acute renal insufficiency;   however, other considerations could be other alcohol ingestions, which could   be contributing.  We will check urine and serum osmolalities.  Other testings   what we sent out are not arrived for several days, which is unhelpful.    Further with this encephalopathy, we will check a CT head to ensure that there   is no acute intracranial process.  Check ammonia level, given history of   liver disease.  Minimize any mind altering or sedating medications.  We will   also check diagnostic alcohol, acetaminophen level and salicylate levels.  As   for his acute kidney injury, I suspect this is likely prerenal/ATN.  He has   Hendrickson in place.  We will continue to monitor urine output, renally dose   medications, minimize any nephrotoxic substances.  Patient did have a   marginally elevated troponin of 0.19.  We will check an EKG; however, I   suspect this is all demand related to his significant anemia, hypotension and   tachycardia.  We also will get an echocardiogram.  Regardless of EKG findings,   he would not be an interventional candidate given he is unable to tolerate   anticoagulative or antiplatelet therapies, given his current acute bleed.  We   will also check HIV status, given his emaciated state.    Patient gravely ill in extremely guarded prognosis.    Total  critical care time not including billable procedures 75 minutes.       ____________________________________     MD SABA Schmidt / INGRID    DD:  01/03/2019 18:17:58  DT:  01/03/2019 20:13:57    D#:  [id number]  Job#:  [id number]

## 2019-01-04 NOTE — PROGRESS NOTES
Paged MD Uribe to update regarding pt's continued hypotension post infusion of bolus. Also updated him regarding results of ISTAT labs. Orders received for 2 units RBCs, 1 gram of calcium gluconate, and a teg with platelet mapping. Orders placed.

## 2019-01-04 NOTE — OR SURGEON
Immediate Post OP Note    PreOp Diagnosis: UGI bleed, cirrhosis.    PostOp Diagnosis:   1. 4 columns of grade 2-3 esophageal varices banded.  2. Moderate to severe portal hypertensive gastropathy but no bleeding.  3. NL duodenum.   4. Sedation time 1513; scope out time 1528.     Procedure(s):  GASTROSCOPY-ENDO    Surgeon(s):  Hansel Da Silva M.D.    Anesthesiologist/Type of Anesthesia:  No anesthesia staff entered./moderate sedation.     Surgical Staff:  Endoscopy Technician: (Unknown)  Sedation/Monitoring Nurse: (Unknown)    Specimens removed if any:  * No specimens in log *    Estimated Blood Loss: none    Findings: see above.     Complications: none intraprocedure.  Plan:  1. Run octreotide another 48 hours.  2. Serial H/H.   3. NG ok if necessary.   Critically ill. Guarded prognosis.         1/4/2019 3:30 PM Hansel Da Silva M.D.

## 2019-01-04 NOTE — PROGRESS NOTES
2 RN skin assessment     Lt hip red blanching  Lt ischium discolored brown non blanching  Lt elbow scab  Lt forearm abrasion/scabs     Rt knee scab  Rt ischium discolored brown non blanching     Mid back red blanching     Wound consult placed   mepelex in place  Baitaine in place

## 2019-01-04 NOTE — ASSESSMENT & PLAN NOTE
Vitamin supplementation  Monitor for alcohol withdrawal syndrome  Eventual alcohol cessation education to be provided once patient is extubated and interviewable

## 2019-01-04 NOTE — FLOWSHEET NOTE
01/03/19 1759   Bronchoscopy Procedure   Bronchoscopy Procedure Yes   Pre-Op Teaching No   Consent Signed Yes   Time Out Performed Yes   Medication Allergy Reviewed Yes   Procedure Performed in Bronch Suite? No   #Diagnostic Procedure  Yes   Start Time 1756   End Time 1759   Performing Physician BRIAN Hearn   Indication(s) for Procedure Identified by Physician Refractory Atelectasis / Pneumonitis   Washing(s) Cytology;AFB;Fungi;Respiratory Culture   Specimen(s) Identified / Labeled for Location RLL   Scope Serial Number [id number]   Post Procedure Response Tolerated Well

## 2019-01-04 NOTE — ASSESSMENT & PLAN NOTE
Intubated for airway protection 1/3  Secondary to aspiration pneumonia  One-way extubation on 1/6/2019

## 2019-01-04 NOTE — PROGRESS NOTES
Late Entry: 1/4/2019 0015    Pt to CT with 2 RN's and RT     Pt hypotensive MD notified pt give 3 doses of russell stick with pharmacist at the bedside

## 2019-01-04 NOTE — PROCEDURES
Date: 1/3/2019      Procedure:  Emergent orotracheal intubation    Indication: Respiratory failure    Physician:  Stanton Hearn M.D.    Consent:  Emergent     Procedure:  This patient has developed increasing respiratory failure,and requires emergent intubation.  RSI given with etomidate and rocuronium.  Using a #4 glidescope, a 8.0 ETT was placed on the first attempt w/o complication.  Tube placement confirmed by direct visualization of placement, end-tidal CO2 monitor color change and equal breath sounds.  No immediate complications.  Vitals remained stable throughout the procedure.  A post-procedure CXR will be reviewed.

## 2019-01-04 NOTE — PROCEDURES
"Date: 1/3/2019    Procedure:  Central Venous Catheter Insertion    Indication: hypotension    Physician:  Stanton eHarn M.D.    Consent:  Emergent; time out performed    Procedure:  The patient was placed in the [place] position.  Appropriate \"time out\" was performed.  The right neck was prepped and draped in the usual sterile fashion.  Under full body sterile barrier precautions, a triple lumen central venous catheter was placed without difficulty in the right IJ position.  US was used for localization and placement.  All lumens were flushed with sterile saline and sterile caps were applied.  The line was sutured in place and a sterile dressing was applied.  There were no immediate complications.  A post-procedure CXR will be reviewed.  Estimated blood loss < 5cc.      "

## 2019-01-04 NOTE — PROGRESS NOTES
Late entry 1/3/2019 1944    Bedside report received from ED RN   Patient transported to unit with 2 RN's and RT

## 2019-01-04 NOTE — RESPIRATORY CARE
Cardiopulmonary Resuscitation/Intubation Assist    Intubation assist performed yes    Reason for intubation ALOC/Respiratory Failure    Positive Color Change on EZCap? Yes    Difficult Intubation/Number of attempts no 1    Evidence of aspiration brown dries blood in airway    Airway ETT Oral 8.0-Secured At  (cm): 22 (01/03/19 1738)  Loza Vent Mode: (S)CMV (01/03/19 1738)     Rate (breaths/min): 26 (01/03/19 1738)  Vt Target (mL): 410 (01/03/19 1738)  FiO2: 40 (01/03/19 1738)  PEEP/CPAP: 8 (01/03/19 1738)       Events/Summary/Plan: Intubated in ER GI Bleed (01/03/19 1738)

## 2019-01-04 NOTE — PROCEDURES
Date: 1/3/2019    Procedure:  Emergent diagnostic and therapeutic bronchoscopy w aspiration and bal    Indication: Respiratory failure, ? pneumonia    Physician:  Stanton Hearn M.D.    Consent:  Emergent     Procedure:  This patient has developed increasing respiratory failure, and required emergent intubation.  Bronchoscopy was indicated for airway evaluation and BAL to evaluate for pneumonia.  A flexible FOB was inserted through the ETT without difficulty.  All airways were evaluated to the sub-segmental level.  The airway mucosa was moderately inflamed with scattered dark chunky secretions which were irrigated and aspirated.  No endobronchial lesions were seen.  The bronchoscope was then wedged in a segment of the RLL bronchus.  30cc of saline was instilled with moderate return of dark, black, sedimentary BAL fluid.  The BAL specimen will be sent for appropriate culture.  No immediate complications.  EBL = 0.

## 2019-01-04 NOTE — PROGRESS NOTES
RCC    Patient assessed and reassessed multiple times  Multiple phone calls with nursing staff and respiratory therapy, case reviewed  Persisting hypotension responding to fluids poorly initially  Recurrent evidence of bleeding with blood per rectum  Platelets infused late, missed in orders somehow?    Hemoglobin dropped from 7.6->6.8  Patient transfused with 2 additional units of packed cells, now 4 units of blood, 2 of FFP and 1 platelet  Platelet count drop to 121  Ionized calcium low -calcium gluconate ordered  Repeat blood gas with improvement in acidosis, 7.5 3/19/128  Ventilator adjusted  Chest x-ray with worsening right middle lobe pneumonia, query aspiration related  Continues on ceftriaxone and metronidazole,   Pro-calcitonin for what it is worth is elevated 7.16, BAL specimen in the lab and pending  Bicarb drip reduced to 75 cc/h, will complete current bag and then discontinue  Urine output actually acceptable but concentrated  LFTs and bilirubin worse  Magnesium low 1.0 - ordered to replete, potassium low and worsening at 2.9, K scale ordered  Hepatitis panel negative, HIV negative  Surprisingly phosphorus okay  Monitor for refeeding syndrome  Rally vitamins IV  Remains on PPI and octreotide  H2 blockers discontinued, not necessary while on PPI  GI to scope today?  IR procedure?  TIPS?  Continue titrating norepinephrine infusion for hypotension query component of sepsis on top of GI bleed  Trending CVP finally double digits exam still suggest he is a bit on the dry side, continue resuscitation  Lactic acid pending  Case reviewed at length with RT and RN along with charge RN times many!  The patient remains critically ill.  Critical care time = 60 minutes in directly providing and coordinating critical care and extensive data review.  No time overlap and excludes procedures.

## 2019-01-04 NOTE — PROGRESS NOTES
Hospital Medicine Daily Progress Note    Date of Service  1/4/2019    Chief Complaint  58 y.o. male admitted 1/3/2019 with altered mentation    Hospital Course    Hx ETOH.  Found by room mate altered on the floor with a large amount of bloody emesis.  Intubated on arrival for airway protection.        Interval Problem Update  ROS unobtainable as pt intubated  Total 4 units PRBCs and 2 FFP overnight  3 litres NS bolus  Levo 10  98.3  UOP 1500ml/12hrs  Prop 10 fent 50  Octreotide  NS 150ml/12hrs  Consultants/Specialty  GI  Pulmonology    Code Status  full    Disposition  Remains critically ill on vent and pressors    Critical care time 33mins: hemorrhagic shock, hypocalcemia, post hemorrhagic anemia.  Folling serial lab, titrating fluids, pressors and blood products, coordinating care    Review of Systems  Review of Systems   Unable to perform ROS: Intubated        Physical Exam  Temp:  [35.6 °C (96 °F)-36.6 °C (97.9 °F)] 36.6 °C (97.9 °F)  Pulse:  [] 93  Resp:  [10-38] 28  BP: ()/(45-66) 85/47    Physical Exam   Constitutional: He appears well-developed. He appears cachectic. No distress.   HENT:   Head: Normocephalic and atraumatic.   Eyes: Conjunctivae are normal.   Neck: No JVD present.   Cardiovascular: Tachycardia present.  Exam reveals no gallop.    Murmur heard.  Pulmonary/Chest: Effort normal. No stridor. No respiratory distress. He has no wheezes. He has no rales.   Abdominal: Soft. There is no tenderness. There is no rebound and no guarding.   Musculoskeletal: He exhibits no edema.   Neurological:   sedated   Skin: Skin is warm and dry. No rash noted. He is not diaphoretic.   Nursing note and vitals reviewed.      Fluids    Intake/Output Summary (Last 24 hours) at 01/04/19 0330  Last data filed at 01/04/19 0220   Gross per 24 hour   Intake           2952.9 ml   Output              500 ml   Net           2452.9 ml       Laboratory  Recent Labs      01/03/19   1347  01/03/19   1921   WBC  10.6    --    RBC  1.81*   --    HEMOGLOBIN  6.2*  7.6*   HEMATOCRIT  18.7*  21.1*   MCV  103.3*   --    MCH  34.3*   --    MCHC  33.2*   --    RDW  70.7*   --    PLATELETCT  139*   --    MPV  10.8   --      Recent Labs      01/03/19   1432  01/03/19   1856   SODIUM  140  139   POTASSIUM  4.1  3.8   CHLORIDE  105  107   CO2  8*  15*   GLUCOSE  111*  146*   BUN  50*  50*   CREATININE  1.99*  1.74*   CALCIUM  7.7*  7.5*     Recent Labs      01/03/19   1432   APTT  55.1*   INR  4.98*               Imaging  CT-HEAD W/O   Final Result      Moderate atrophy without acute intracranial abnormality identified      Chronic right maxillary sinus inflammatory disease with possible prior fracture      CT-RENAL COLIC EVALUATION(A/P W/O)   Final Result      Diffuse colonic wall thickening is nonspecific but can be seen with infectious favored over inflammatory or ischemic colitis. Anasarca could also contribute      Right middle lobe groundglass and consolidation is compatible with bronchopneumonia favored over aspiration or edema      Cholelithiasis      DX-CHEST-PORTABLE (1 VIEW)   Final Result      Endotracheal tube and right IJ central line as described above.      Hypoventilatory change.      EC-ECHOCARDIOGRAM COMPLETE W/O CONT    (Results Pending)   DX-CHEST-PORTABLE (1 VIEW)    (Results Pending)        Assessment/Plan  * Hypovolemic shock from GI bleeding (HCC)   Assessment & Plan    q6 Hgb's  S/p 4 units PRBCs, 2 units FFP  Titrate pressors and fluids  Etiology UGIB: EGD pending     Hypocalcemia   Assessment & Plan    Give 1g IV CaCl  Check ionized Ca afterwards  Follow daily     Coagulopathy (HCC)   Assessment & Plan    S/p 2 units FFP  Subsequent TEG is re-assuring     Aspiration pneumonia (HCC)   Assessment & Plan    Ceftriaxone and Metro  Follow cultures     Acute blood loss anemia   Assessment & Plan    Transfuse PRBCS  Trend H/H  Iron panel ordered     GI bleeding   Assessment & Plan    High risk of varices  IV PPI  IV  Octreotide  Serial H&H  INR corrected with FFP and subsequent TEG benign  Good access  GI consulted and anticipate EGD     Acute respiratory failure with hypoxia (HCC)   Assessment & Plan    Intubated for airway protection 1/3  Pulmonology following  Treating aspiration     Tobacco dependence   Assessment & Plan    When he is better can  him on this.     History of alcohol dependence (HCC)   Assessment & Plan    Noted, per my dicussion with aunt  They feel he also may have brought illicit drugs (syringes) unclear if actual medicine from [place] or illegal  Follow for evidence of withdrawal     End stage liver disease (HCC)   Assessment & Plan    Hep panel neg  GI consulted  They feel he also may have brought illicit drugs (syringes) unclear if actual medicine from [place] or illegal  APAP 0; no Hx of chronic ingestion that we know of  LFTs benign  INR elevated     Toxic metabolic encephalopathy   Assessment & Plan    Multifactorial from GI bleed  Hepatic encephalopathy  GI consulted  CT head benign          VTE prophylaxis: SCs, chemical is contraindicated

## 2019-01-04 NOTE — ASSESSMENT & PLAN NOTE
Improving  Limit sedatives and monitor neurologic function closely  Continue to correct underlying metabolic conditions

## 2019-01-04 NOTE — H&P
"Hospital Medicine History & Physical Note    Date of Service  1/3/2019    Primary Care Physician  No primary care provider on file.    Consultants  GI  Intensivist    Code Status  Full code for now  He has a sister who is POA but unable to reach yet  Family member is cousin right now who knows little about his history    Chief Complaint  Lower GI Bleed      History of Presenting Illness  58 y.o. male who presented 1/3/2019 with Lower GI Bleed  Cannot give history. Aunt at bedroom only can give minimal histoty. He travels [place] half the time and lives half the time  To the best of their knowledge, aunt didn't think he drank recently.  He sees a primary provider up in [facility] and maybe a doctor in [place]. Per Aunt they said something along the lines that his liver had as \"oily liver\"  Also not sure if he has IVDU she said he uses syringes. Doesn't know if this is actual prescription medicine from [place] or illicit drugs  She did say he is a heavy drinker and smoker.    At the ED, he is afebrile but hypotensive  Hb came back 6.2. INR at 5. Reversed with FFP and unit of blood ordered  CMP still pending  Patient was altered  EDP called GI for consult. Then endorsed to hospitalist. No further recs from GI. There is PPI and octreotide infused.  I continued ED and critical work-up and management.  I called and consulted Dr. Hearn as patient will go to ICU  I assessed prognosis. At this time prognosis is poor. I did speak with family. Full code and sister is POA, who currently we couldn't reach.  When I saw him at ED, he is altered, responding to pain but somnolent and not verbal. Dried blood mouth area. Does not appear jaundiced to me. Family at bedside.    Review of Systems  Review of Systems   Unable to perform ROS: Critical illness       Past Medical History   has no past medical history on file.  Unclear but may have liver disease  Surgical History   has no past surgical history on file.     Family History  family " history is not on file.   Unable to obtain from patient.   Social History   Heavy smoker and drinker per Aunt    Allergies  No Known Allergies    Medications  None       Physical Exam  Temp:  [35.6 °C (96 °F)-36.4 °C (97.6 °F)] 36.4 °C (97.6 °F)  Pulse:  [105-113] 105  Resp:  [15-20] 20  BP: (71-96)/(52-57) 96/57    Physical Exam   Constitutional: He appears well-developed and well-nourished.   Thin   HENT:   Head: Normocephalic and atraumatic.   Dried blood in mouth  Temporal wasting   Eyes: Conjunctivae and EOM are normal. No scleral icterus.   Neck: Normal range of motion. Neck supple.   Cardiovascular: Normal rate and regular rhythm.  Exam reveals no gallop and no friction rub.    No murmur heard.  Pulmonary/Chest: Effort normal and breath sounds normal. No respiratory distress. He has no wheezes. He has no rales.   Diminished bases   Abdominal: Soft. Bowel sounds are normal. He exhibits no distension. There is no tenderness. There is no rebound and no guarding.   Musculoskeletal: He exhibits no edema or tenderness.   Neurological:   Somnolent   Skin: Skin is warm.   Psychiatric:   Not agitated       Laboratory:  Recent Labs      01/03/19   1347   WBC  10.6   RBC  1.81*   HEMOGLOBIN  6.2*   HEMATOCRIT  18.7*   MCV  103.3*   MCH  34.3*   MCHC  33.2*   RDW  70.7*   PLATELETCT  139*   MPV  10.8         No results for input(s): ALTSGPT, ASTSGOT, ALKPHOSPHAT, TBILIRUBIN, DBILIRUBIN, GAMMAGT, AMYLASE, LIPASE, ALB, PREALBUMIN, GLUCOSE in the last 72 hours.  Recent Labs      01/03/19   1432   APTT  55.1*   INR  4.98*             Recent Labs      01/03/19   1432   TROPONINI  0.19*       Urinalysis:    No results found     Imaging:  CT-HEAD W/O   Final Result      Moderate atrophy without acute intracranial abnormality identified      Chronic right maxillary sinus inflammatory disease with possible prior fracture      CT-RENAL COLIC EVALUATION(A/P W/O)   Final Result      Diffuse colonic wall thickening is nonspecific but  can be seen with infectious favored over inflammatory or ischemic colitis. Anasarca could also contribute      Right middle lobe groundglass and consolidation is compatible with bronchopneumonia favored over aspiration or edema      Cholelithiasis      DX-CHEST-PORTABLE (1 VIEW)   Final Result      Endotracheal tube and right IJ central line as described above.      Hypoventilatory change.      EC-ECHOCARDIOGRAM COMPLETE W/O CONT    (Results Pending)   DX-CHEST-PORTABLE (1 VIEW)    (Results Pending)         Assessment/Plan:  I anticipate this patient will require at least two midnights for appropriate medical management, necessitating inpatient admission.    * Hypovolemic shock from GI bleeding (HCC)   Assessment & Plan    Transfer to ICU  GI has been consulted  Continue protonix and somatstain infusions  Intensivist consulted  Central line and likely intubation to be placed.     Coagulopathy (HCC)   Assessment & Plan    INR reversed  Ordered TEG platelet study     Aspiration pneumonia (HCC)   Assessment & Plan    I ordered CT renal  That showed evidence of pneumonia  I have already ordered antibiotics to cover for intra abdominal and pneumonia sources     Acute blood loss anemia   Assessment & Plan    Transfuse PRBCS  Trend H/H  Iron panel ordered     GI bleeding   Assessment & Plan    As above     Acute respiratory failure with hypoxia (HCC)   Assessment & Plan    For airway protection  Has aspiration  Will be intubated     Tobacco dependence   Assessment & Plan    When he is better can  him on this.     History of alcohol dependence (HCC)   Assessment & Plan    Noted, per my dicussion with aunt  They feel he also may have brought illicit drugs (syringes) unclear if actual medicine from [place] or illegal  Ordered UDS and serum drug screen along with Tylenol level  Will be intubated and sedated in case he has withdrawal.     End stage liver disease (HCC)   Assessment & Plan    CMP was still pending upon  endorsement  INR was elevated has been given FFP and platelets for reversal  Ordered ammonia level  GI consulted  They feel he also may have brought illicit drugs (syringes) unclear if actual medicine from [place] or illegal  Ordered UDS and serum drug screen along with Tylenol level     Toxic metabolic encephalopathy   Assessment & Plan    Multifactorial from GI bleed  Ordered ammonia level  GI consulted  Will be intubated for airway protection  Ordered STAT head CT r/o bleed as INR 5         VTE prophylaxis: SCD  Reviewed vitals, labs, imaging, staff notes.  Discussed assessment and plan with Filiberto Barajas  Discussed with ED physician.  Discussed with Dr. Hearn  I personally provided 60 minutes critical care time exclusive of time spent on procedures. Time includes review of laboratory data, imaging, discussion with consultants and staff and monitoring hemodynamics for bleeding shock for potential decompensation

## 2019-01-04 NOTE — PROGRESS NOTES
Pt remains hypotensive Dr Uribe notified   Orders received for norepinephrine  Orders read back to MD orders placed   Pharmacy notified of STAT order

## 2019-01-04 NOTE — ASSESSMENT & PLAN NOTE
Drug cessation education to be provided once patient extubated   Monitor for potential withdrawal syndrome

## 2019-01-04 NOTE — PROGRESS NOTES
Critical Care Progress Note    Date of admission  1/3/2019    Chief Complaint  58 y.o. male admitted 1/3/2019 with hematemesis and AMS    Hospital Course    58-year-old gentleman with no known   medical history except for longstanding alcohol use, some form of possible   liver disease and per cousin a significant amount of weight loss over this   past year.  Patient lives apparently in [place], has a roommate, was   reportedly seen normal last night.  In and around 3 in the morning, he had   made some noise that the roommate responded to and he subsequently said that   he was okay, but did have apparently some emesis at that time.  Later this   morning, he was found by his roommate lying on the ground with what appeared   to be a large amount of bloody vomitus.  EMS was called and patient was   subsequently brought into [facility] for further evaluation.  At this time, he is   unresponsive, poorly arousable, will open eyes to deep sternal rub, but not   communicate or answer.  His cousin is at bedside, has a very minimal past   medical history and states that he does not divulge much, but has some form of   underlying liver disease.  Patient of note also does spend a moderate amount   of the year in [place] and had perhaps some kind of GI bleed while he was there   last.  She indicates that he was a heavy drinker, but believed that he had   stopped drinking for the last several months.        Interval Problem Update  Reviewed last 24 hour events:   - 4 pRBC< 2 FFP   - levophed @ 10   - sinus tach   - CT head ok   - rectal trumpet with joel blood   - adequate UOP   - restless with mobility   - increased WBC   - Hgb at 7.6   - abx day 2 (rocephin/flagyl)   - plts low at 140   - low K and Mag, d/c bicarb gtt   - lactate improving   - plans for EGD   - prop 10, fent 50, octreotide, PPI gtts    Review of Systems  Review of Systems   Unable to perform ROS: Intubated        Vital Signs for last 24 hours   Temp:  [35.6 °C  (96 °F)-36.6 °C (97.9 °F)] 36.6 °C (97.9 °F)  Pulse:  [] 98  Resp:  [10-38] 20  BP: ()/(45-66) 85/47    Hemodynamic parameters for last 24 hours  CVP:  [9 MM HG-300 MM HG] 14 MM HG    Respiratory  Loza Vent Mode: APVCMV  Rate (breaths/min): 20  Vt Target (mL): 410  PEEP/CPAP: 8  FiO2: 30  P MEAN: 9.8  Control VTE (exp VT): 395 - SaO2 100% on 30% FiO2    Physical Exam   Physical Exam   Constitutional: He appears well-developed. He has a sickly appearance. He appears ill. He is sedated and intubated.   Chronically ill-appearing, cachectic male   HENT:   Head: Normocephalic and atraumatic.   Nose: Nose normal.   Mouth/Throat: Mucous membranes are pale and dry.   Eyes: Pupils are equal, round, and reactive to light. Scleral icterus is present.   Neck: Neck supple. No JVD present. No tracheal deviation present.   Right IJ central venous catheter without surrounding hematoma   Cardiovascular: Regular rhythm.   Occasional extrasystoles are present. Tachycardia present.  PMI is not displaced.  Exam reveals decreased pulses. Exam reveals no distant heart sounds.    Murmur heard.  Pulses:       Radial pulses are 1+ on the right side, and 1+ on the left side.   Delayed capillary refill   Pulmonary/Chest: No accessory muscle usage. Tachypnea noted. He is intubated. He has no decreased breath sounds. He has no wheezes. He has rhonchi in the right lower field and the left lower field. He has no rales.   Abdominal: Soft. He exhibits shifting dullness, distension, fluid wave and ascites. Bowel sounds are increased. There is generalized tenderness. There is no rebound and no guarding.   Genitourinary: Penis normal.   Genitourinary Comments: Hendrickson catheter in place   Musculoskeletal: He exhibits edema. He exhibits no deformity.   Neurological: He is unresponsive. He exhibits normal muscle tone.   Sedated, withdraws to pain, no focal deficits   Skin: He is not diaphoretic. There is pallor.   Jaundiced, spider angiomata    Psychiatric:   Unable to assess given current clinical condition   Nursing note and vitals reviewed.      Medications  Current Facility-Administered Medications   Medication Dose Route Frequency Provider Last Rate Last Dose   • NS infusion   Intravenous Continuous Guerrero Uribe M.D.       • norepinephrine (LEVOPHED) 8 mg in  mL Infusion  0-30 mcg/min Intravenous Continuous Guerrero Uribe M.D. 18.8 mL/hr at 01/04/19 0303 10 mcg/min at 01/04/19 0303   • magnesium sulfate IVPB premix 2 g  2 g Intravenous Q6HR Guerrero Uribe M.D.   2 g at 01/04/19 0759   • K+ Scale: Goal of 4.5  1 Each Intravenous Q6HRS Guerrero Uribe M.D.   1 Each at 01/04/19 0745   • Rally Bag infusion   Intravenous Q24HR Guerrero Uribe M.D.       • potassium chloride in water (KCL) ivpb **Administer in ICU only** 20 mEq  20 mEq Intravenous Once Jeremy M Gonda, M.D. 50 mL/hr at 01/04/19 0759 20 mEq at 01/04/19 0759   • potassium chloride (KCL) ivpb 10 mEq  10 mEq Intravenous Once Jeremy M Gonda, M.D.       • pantoprazole (PROTONIX) 80 mg in  mL Infusion  8 mg/hr Intravenous Continuous Stanton Hearn M.D. 25 mL/hr at 01/04/19 0102 8 mg/hr at 01/04/19 0102   • octreotide (SANDOSTATIN) 1,250 mcg in  mL Infusion  50 mcg/hr Intravenous Continuous Stanton Hearn M.D. 10 mL/hr at 01/03/19 1619 50 mcg/hr at 01/03/19 1619   • NS infusion   Intravenous Continuous Edwin Coronado M.D. 150 mL/hr at 01/04/19 0341     • metroNIDAZOLE (FLAGYL) IVPB 500 mg  500 mg Intravenous Q8HRS Edwin Coronado M.D.   Stopped at 01/04/19 0656   • cefTRIAXone (ROCEPHIN) 1 g in  mL IVPB  1 g Intravenous Q24HR Stanton Hearn M.D.       • Respiratory Care per Protocol   Nebulization Continuous RT Stanton E Nadiya, M.D.       • senna-docusate (PERICOLACE or SENOKOT S) 8.6-50 MG per tablet 2 Tab  2 Tab Oral BID Stanton Hearn M.D.   Stopped at 01/04/19 0600    And   • polyethylene glycol/lytes (MIRALAX) PACKET 1 Packet  1 Packet  Oral QDAY PRN Stanton Hearn M.D.        And   • magnesium hydroxide (MILK OF MAGNESIA) suspension 30 mL  30 mL Oral QDAY PRN Stanton Hearn M.D.        And   • bisacodyl (DULCOLAX) suppository 10 mg  10 mg Rectal QDAY PRN Stanton Hearn M.D.       • MD Alert...ICU Electrolyte Replacement per Pharmacy   Other pharmacy to dose Stanton Hearn M.D.       • fentaNYL (SUBLIMAZE) injection 25 mcg  25 mcg Intravenous Q HOUR PRN Stanton Hearn M.D.        Or   • fentaNYL (SUBLIMAZE) injection 50 mcg  50 mcg Intravenous Q HOUR PRN Stanton Hearn M.D.        Or   • fentaNYL (SUBLIMAZE) injection 100 mcg  100 mcg Intravenous Q HOUR PRN Stanton Hearn M.D.       • ipratropium-albuterol (DUONEB) nebulizer solution  3 mL Nebulization Q2HRS PRN (RT) Stanton Hearn M.D.       • ipratropium-albuterol (DUONEB) nebulizer solution  3 mL Nebulization Q4HRS (RT) Stanton Hearn M.D.   3 mL at 01/04/19 0724   • insulin regular (HUMULIN R) injection 1-6 Units  1-6 Units Subcutaneous Q6HRS Stanton Hearn M.D.   Stopped at 01/03/19 1830    And   • glucose 4 g chewable tablet 16 g  16 g Oral Q15 MIN PRN Stanton Hearn M.D.        And   • dextrose 50% (D50W) injection 25 mL  25 mL Intravenous Q15 MIN PRN Stanton Hearn M.D.       • propofol (DIPRIVAN) injection  0-80 mcg/kg/min Intravenous Continuous Stanton Hearn M.D. 3.7 mL/hr at 01/03/19 2033 10 mcg/kg/min at 01/03/19 2033   • sodium bicarbonate 8.4 % 150 mEq in sterile water for inj(pf) 1,000 mL infusion   Intravenous Continuous Guerrero Uribe M.D. 75 mL/hr at 01/04/19 0559     • phenylephrine (MELVINA-SYNEPHRINE) 100 mcg/mL inj (ER IV Push Syringe) 100 mcg  100 mcg Intravenous Q15 MIN PRN Guerrero Uribe M.D.   100 mcg at 01/04/19 0057   • SODIUM CHLORIDE 0.9 % IV SOLN            • fentaNYL (SUBLIMAZE) 50 mcg/mL in 50mL (Continuous Infusion)   Intravenous Continuous Guerrero Uribe M.D. 1 mL/hr at 01/03/19 2157 50 mcg/hr at 01/03/19 2157   • lactulose 10  GM/15ML solution 300 mL  300 mL Rectal Q6HRS Stanton Hearn M.D.   300 mL at 01/04/19 0648       Fluids    Intake/Output Summary (Last 24 hours) at 01/04/19 0828  Last data filed at 01/04/19 0600   Gross per 24 hour   Intake          7651.38 ml   Output             2000 ml   Net          5651.38 ml       Laboratory  Recent Labs      01/03/19   2301  01/04/19   0130  01/04/19   0717   ISTATAPH  7.473  7.523*  7.503*   ISTATAPCO2  20.6*  19.5*  25.4*   ISTATAPO2  172*  131*  119*   ISTATATCO2  16*  17*  21   ISLMSZU4VXD  100*  99  99   ISTATARTHCO3  15.1*  16.1*  20.0   ISTATARTBE  -8*  -6*  -3   ISTATTEMP  97.5 F  97.7 F  99.2 F   ISTATFIO2  40  30  30   ISTATSPEC  Arterial  Arterial  Arterial   ISTATAPHTC  7.482  7.531*  7.497   [id number]  169*  128*  121*     Recent Labs      01/03/19   1432   TROPONINI  0.19*     Recent Labs      01/03/19   1856  01/04/19 0319 01/04/19   0554   SODIUM  139  142  145   POTASSIUM  3.8  3.1*  2.9*   CHLORIDE  107  114*  115*   CO2  15*  17*  18*   BUN  50*  48*  40*   CREATININE  1.74*  1.09  0.96   MAGNESIUM   --   1.0*  1.0*   PHOSPHORUS   --   3.7  3.2   CALCIUM  7.5*  6.8*  6.9*     Recent Labs      01/03/19   1432  01/03/19   1856  01/04/19 0319 01/04/19   0554   ALTSGPT  34   --   38   --    ASTSGOT  60*   --   72*   --    ALKPHOSPHAT  98   --   70   --    TBILIRUBIN  2.4*   --   2.9*   --    LIPASE  10*   --    --    --    GLUCOSE  111*  146*  128*  122*     Recent Labs      01/03/19   1347  01/03/19   1432  01/04/19 0319 01/04/19   0554   WBC  10.6   --   10.4  11.8*   NEUTSPOLYS  77.90*   --   75.60*  75.00*   LYMPHOCYTES  13.50*   --   18.20*  18.70*   MONOCYTES  8.10   --   5.30  5.40   EOSINOPHILS  0.00   --   0.20  0.30   BASOPHILS  0.20   --   0.30  0.30   ASTSGOT   --   60*  72*   --    ALTSGPT   --   34  38   --    ALKPHOSPHAT   --   98  70   --    TBILIRUBIN   --   2.4*  2.9*   --      Recent Labs      01/03/19   1347  01/03/19   1432  01/03/19   6577   01/04/19   0319  01/04/19   0554   RBC  1.81*   --    --   2.35*  2.40*   HEMOGLOBIN  6.2*   --   7.6*  7.2*  7.6*   HEMATOCRIT  18.7*   --   21.1*  20.9*  21.2*   PLATELETCT  139*   --    --   121*  140*   PROTHROMBTM   --   46.2*   --    --    --    APTT   --   55.1*   --    --    --    INR   --   4.98*   --    --    --        Imaging  X-Ray:  I have personally reviewed the images and compared with prior images. and My impression is: Right middle lobe infiltrate, endotracheal tube 6.3 cm above the quintin, right IJ central venous catheter in good position    Assessment/Plan  * Hypovolemic shock from GI bleeding (HCC)   Assessment & Plan    Continue with fluid resuscitation, blood products as needed  Titrate norepinephrine to maintain mean arterial pressure greater than 60  Serial CBC with conservative transfusion strategy being hemoglobin greater than 7  Trend CVP with goal 10-12  Trend lactic acid level for adequacy of resuscitation     GI bleeding   Assessment & Plan    Likely upper gastrointestinal source, risk for variceal bleed  Continue Protonix and octreotide drips  Serial CBC  Gastroneurology consultation for EGD  Rocephin times 7 days     Acute respiratory failure with hypoxia (HCC)   Assessment & Plan    Intubated 1/3  Continue full mechanical ventilatory support, decrease respiratory rate to 16  Advance endotracheal tube by 2 cm  RT/O2 protocols  Titrate FiO2 to goal SaO2 greater than 92%  Hold SBT and mobilization for today given shock and pending EGD     Hypocalcemia   Assessment & Plan    Replace with IV calcium chloride  Monitor ionized calcium levels     Coagulopathy (HCC)   Assessment & Plan    Secondary to alcohol abuse  Correct with transfusions as needed in the setting of GI bleed, check a TEG/platelet mapping     Aspiration pneumonia (HCC)   Assessment & Plan    Continue Rocephin and Flagyl times 5 days  Follow-up on cultures     Acute blood loss anemia   Assessment & Plan    Serial  CBC  Conservative transfusion strategy     History of alcohol dependence (HCC)   Assessment & Plan    Vitamin supplementation  Monitor for alcohol withdrawal syndrome     End stage liver disease (HCC)   Assessment & Plan    Decompensated  Gastroneurology consultation  Monitor synthetic function  Hepatitis panel     Toxic metabolic encephalopathy   Assessment & Plan    Limit sedatives and monitor neurologic function closely  May require EEG and/or MRI brain if patient does not improve with sedation limits and correction of metabolic abnormalities     Hypomagnesemia   Assessment & Plan    IV repletion and monitor closely     Hypokalemia   Assessment & Plan    IV repletion and monitor closely     Cocaine abuse (HCC)   Assessment & Plan    Drug cessation education to be provided once patient extubated   Monitor for potential withdrawal syndrome     Tobacco dependence   Assessment & Plan    Tobacco cessation education to be provided once extubated  Nicotine patch     Full code    VTE:  Contraindicated  Ulcer: PPI  Lines: Central Line  Ongoing indication addressed and Hendrickson Catheter  Ongoing indication addressed    I have performed a physical exam and reviewed and updated ROS and Plan today (1/4/2019). In review of yesterday's note (1/3/2019), there are no changes except as documented above.     Discussed patient condition and risk of morbidity and/or mortality with Hospitalist, Family, RN, RT, Pharmacy, , Charge nurse / hot rounds and GI  The patient remains critically ill.  Critical care time = 46 minutes in directly providing and coordinating critical care and extensive data review.  No time overlap and excludes procedures.

## 2019-01-04 NOTE — DISCHARGE PLANNING
Anticipated Discharge Disposition: TBD    Action: LSW met w/ pt's family/friend: Sisters Chaz Horton and best friend Eduin. They are requiring any medical updates be provided to them only. They do not want to set a password if others will not be able to see pt as he has many friends and a large family.    Pt lives half time in [place] and half time in [place], [place]. 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 He lost his father recently. Pt is a very private person and would not want anyone to know about his medical conditions or dxes.    Pt would want to be a DNR and not be resuscitated. They are in agreement on this as a group.    Pt reportedly has private insurance. The family is working to acquire his insurance and social security information to provide to hospital. Eduin indicates pt stated his monthly premium for insurance is $1200.00. Cousin reported, earlier at bedside, pt has insurance from [employer] which works in the [place] too. Lsw requested of all involved parties to please locate pt's insurance information so this can be added to his chart.      Pt was a large, charismatic, very muscular, and handsome man his whole adulthood. He started getting very thin the last 9 months and family has been alarmed.    Pt has hx of being very stubborn and may not want to stay in hospital or go to SNF/Rehab once he is A/O and able to make decisions. LSw advised the medical team would work to explain to pt how important his d/c plan/orders will be for his continued health and independence.           Barriers to Discharge: TBD    Plan: f/u w/ sister Sol ([phone]) for insurance information and social security information to assist w/ d/c planning going forward, f/u w/ medical team

## 2019-01-04 NOTE — OP REPORT
DATE OF SERVICE:  01/04/2019    REFERRING PHYSICIAN:  Edwin Coronado MD    PROCEDURE PERFORMED:  Gastroscopy.    INDICATION:  A 58-year-old, man admitted with cirrhosis and altered mental   status and upper GI bleeding.    ASA:  Class IV.    SEDATION TOTAL:  Fentanyl 100 mcg IV, Versed 3 mg IV.    FINDINGS:  1.  Four columns of grade II to III esophageal varices banded.  2.  Moderate-to-severe portal hypertensive gastropathy, but no active   bleeding.  3.  Normal duodenum.  4.  Sedation time of 1513.  Scope out time was 1528.    PROCEDURE:  After informed consent, per family member and a time-out, he was   administered a conservative IV moderate sedation, he has been on a propofol   drip, tend to drop pressures when the propofol drip was increased.  He sedated   easily with fentanyl and Versed.  He has previously been intubated.    The Olympus flexible video gastroscope was passed gently into the hypopharynx   and the esophagus was intubated.  There was no fresh or old blood in the   esophagus.  In the distal esophagus, there were 4 columns of grade II to III   esophageal varices with some higher risk stigmata, but no active bleeding.  GE   junction is at 43 cm and the varices distended up to about 32 cm.  The   stomach was entered, insufflated with air.  The stomach was diffusely boggy   and erythematous with diffuse red petechia consistent with moderate-to-severe   portal hypertensive gastropathy.  There was no fresh or old blood.  The   pylorus was patent.  Duodenal bulb and second portion were normal.  Retroflex   view of the gastric cardia was negative for any gastric varices.    The scope was then brought back up into the esophagus and the varices were   localized from the GE junction at 43 cm up to about 32 cm.  Scope was removed   from the patient and a banding apparatus was loaded on to the scope.  Scope   was then reinserted to the GE junction.  The varices were banded at the GE   junction x4, in the  2nd around the bands were placed above the GE junction at   about 36 cm for a total of 7 bands.  There was no bleeding provoked.  He   tolerated the procedure well and was stable blood pressure in the 90s   throughout the entire exam.    IMPRESSION:  1.  Esophageal varices banded.  2.  Moderate to severe portal hypertensive gastropathy, but no active   bleeding.  3.  Exam negative for gastric varices.  4.  Normal duodenum.    PLAN:  1.  Continue octreotide drip for another 48 hours.  2.  Serial H and H.  3.  NG tube is okay if necessary, he is currently critically ill and has a   guarded prognosis.  If he is stable overnight, he may be able to extubate him   start full liquids tomorrow.       ____________________________________     JAZMIN THORNTON MD    [facility] / [facility]    DD:  01/04/2019 15:37:14  DT:  01/04/2019 15:51:03    D#:  [id number]  Job#:  [id number]

## 2019-01-04 NOTE — ED NOTES
Assumed care of patient. Pt assessed.  Fall precautions in place.  Pt repositioned and comfortable. Will continue to monitor.

## 2019-01-04 NOTE — ED NOTES
Unable to obtain Med Rec at this time  Unable to Review Allergies  Unknown PO ABX HX    Pt unable to participate in interview at this time.     Family at bedside states she and the rest of their family has not known about what the Pt has been up to in the last year and maintains very little contact. She states Pt has been very sick and not forthcoming about it.     No pharmacy on file.    Tech to follow up with Pt if possible in AM.

## 2019-01-04 NOTE — PROGRESS NOTES
RCC    Called for hypotension, SBp 60-70s  Intubated this evening, agitated on vent and Propofol started  After dropped Bp Propofol weaned back to 10  UO adequate but concentrated, SR 90s  Dry on exam - tenting of skin all 4 extrem, dry mouth, cachectic  Last Hgb 7.6, no clear bleeding now  Patient has received 2 FFP, 2 pRBCs and 1 liter NS so far per RN  TEG noted AA/ADP inhib 87%/93%, fibrinogen 193, R time normal at 9  ALB 2.1, NH3 226!  1st ABG 7.19/39/117  CHYNA but renal function better  ICa+2 pending  Bicarb drip ordered earlier being initiated as I arrived    HYPOTENSION?:  Propofols/positive airway pressure/hypovolemia?  Cont IVF 100cc/hr  UO/VS/CVP monitoring  Repeat Saline bolus 1000cc  ALB 50 grams IV x 1  Plt transfusion order noted    Draw next H/H early if Bp not up with weaning Propofol and bolus  Repeat ABG with next H/H  If bleeding monitor for need for RED BOX and Repeat TEG  May need repeat ABG and corrective measures for AGMA  Start FENT drip - limit Propofol  May need Ca IV supplements  Lactulose    The patient remains critically ill.  Critical care time = 40 minutes in directly providing and coordinating critical care and extensive data review.  No time overlap and excludes procedures.    Prognosis poor - remains full code    EHR reviewed at length  D/w Wife, daughter, charge RN, RN and RT at the bedside at length

## 2019-01-05 PROBLEM — R79.89 ELEVATED TROPONIN: Status: ACTIVE | Noted: 2019-01-01

## 2019-01-05 PROBLEM — R57.1 HYPOVOLEMIC SHOCK (HCC): Status: RESOLVED | Noted: 2019-01-01 | Resolved: 2019-01-01

## 2019-01-05 PROBLEM — E43 SEVERE PROTEIN-CALORIE MALNUTRITION (HCC): Status: ACTIVE | Noted: 2019-01-01

## 2019-01-05 PROBLEM — E83.39 HYPOPHOSPHATEMIA: Status: ACTIVE | Noted: 2019-01-01

## 2019-01-05 NOTE — PROGRESS NOTES
Gastroenterology Progress Note     Author: Hansel Da Silva   Date & Time Created: 1/5/2019 8:00 AM    Chief Complaint:  Decompensated cirrhosis with variceal bleed banded yesterday.     Interval History:  S: BP low overnight which necessitated increasing Levo. No active bleed. Still intubated. Getting lactulose enemas. EGD showed esophageal varices which were banded and moderate to severe portal hypertensive gastropathy.   Very little medical history is known about him although family members are present.     Review of Systems:  Review of Systems   Unable to perform ROS: Intubated       Physical Exam:  Physical Exam   Constitutional: He is oriented to person, place, and time.   Lean.    Cardiovascular: Normal rate and regular rhythm.    Pulmonary/Chest: Effort normal and breath sounds normal. No respiratory distress.   Intubated.    Abdominal: Soft. Bowel sounds are normal. He exhibits no distension. There is no tenderness. There is no rebound and no guarding.   Musculoskeletal: He exhibits no edema.   Neurological: He is alert and oriented to person, place, and time.   Skin: Skin is warm and dry.   Multiple spider angiomas.        Labs:  Recent Labs      01/04/19   0130  01/04/19   0717  01/05/19   0429   ISTATAPH  7.523*  7.503*  7.440   ISTATAPCO2  19.5*  25.4*  30.5   ISTATAPO2  131*  119*  100*   ISTATATCO2  17*  21  22   [id number]  99  99  98   ISTATARTHCO3  16.1*  20.0  20.8   ISTATARTBE  -6*  -3  -3   ISTATTEMP  97.7 F  99.2 F  98.7 F   ISTATFIO2  30  30  30   ISTATSPEC  Arterial  Arterial  Arterial   ISTATAPHTC  7.531*  7.497  7.439   [id number]  128*  121*  100*     Recent Labs      01/04/19   0554  01/04/19   1730  01/04/19   2329   TROPONINI  1.58*  1.19*  0.98*     Recent Labs      01/04/19   0319  01/04/19   0554  01/04/19   1145  01/04/19   1730  01/04/19   2329  01/05/19   0500   SODIUM  142  145  142   --    --   145   POTASSIUM  3.1*  2.9*  3.0*  2.9*  3.0*  3.2*   CHLORIDE  114*  115*  112    --    --   115*   CO2  17*  18*  21   --    --   24   BUN  48*  40*  35*   --    --   18   CREATININE  1.09  0.96  0.85   --    --   0.54   MAGNESIUM  1.0*  1.0*   --   2.4   --   2.1   PHOSPHORUS  3.7  3.2   --    --    --   1.7*   CALCIUM  6.8*  6.9*  7.8*   --    --   7.7*     Recent Labs      1954  19   1145  19   0500   ALTSGPT  34   --   38   --    --    --    ASTSGOT  60*   --   72*   --    --    --    ALKPHOSPHAT  98   --   70   --    --    --    TBILIRUBIN  2.4*   --   2.9*   --    --    --    LIPASE  10*   --    --    --    --    --    GLUCOSE  111*   < >  128*  122*  147*  129*    < > = values in this interval not displayed.     Recent Labs      19   1730  19   2329  19   0500   RBC   --    --   2.35*  2.40*   --    --    --   2.37*   HEMOGLOBIN   --    < >  7.2*  7.6*   < >  7.9*  8.1*  7.8*   HEMATOCRIT   --    < >  20.9*  21.2*   < >  22.4*  22.4*  21.5*   PLATELETCT   --    --   121*  140*   --    --    --   135*   PROTHROMBTM  46.2*   --    --    --    --    --    --    --    APTT  55.1*   --    --    --    --    --    --    --    INR  4.98*   --    --    --    --    --    --    --     < > = values in this interval not displayed.     Recent Labs      1954  19   0500   WBC   --   10.4  11.8*  6.3   NEUTSPOLYS   --   75.60*  75.00*  60.20   LYMPHOCYTES   --   18.20*  18.70*  33.60   MONOCYTES   --   5.30  5.40  4.40   EOSINOPHILS   --   0.20  0.30  1.80   BASOPHILS   --   0.30  0.30  0.00   ASTSGOT  60*  72*   --    --    ALTSGPT  34  38   --    --    ALKPHOSPHAT  98  70   --    --    TBILIRUBIN  2.4*  2.9*   --    --      Hemodynamics:  Temp (24hrs), Av.9 °C (98.5 °F), Min:36.8 °C (98.3 °F), Max:37.1 °C (98.8 °F)  Temperature: 36.9 °C (98.5 °F)  Pulse  Av.1  Min: 72  Max: 128Heart Rate (Monitored): 71  NIBP: (!)  90/50  CVP (mm Hg): (!) 287 MM HG  Respiratory:  Loza Vent Mode: APVCMV, Rate (breaths/min): 16, PEEP/CPAP: 8, FiO2: 30, P Peak (PIP): 17, P MEAN: 10 Respiration: 18, Pulse Oximetry: 100 %     Work Of Breathing / Effort: Vented  RUL Breath Sounds: Clear, RML Breath Sounds: Clear, RLL Breath Sounds: Diminished, ERICA Breath Sounds: Clear, LLL Breath Sounds: Diminished  Fluids:    Intake/Output Summary (Last 24 hours) at 01/05/19 0800  Last data filed at 01/05/19 0600   Gross per 24 hour   Intake           7474.5 ml   Output             2155 ml   Net           5319.5 ml     Weight: 70.8 kg (156 lb 1.4 oz)  GI/Nutrition:  Orders Placed This Encounter   Procedures   • Diet NPO     Standing Status:   Standing     Number of Occurrences:   1     Order Specific Question:   Type:     Answer:   Now [1]     Order Specific Question:   Restrict to:     Answer:   Strict [1]     Medical Decision Making, by Problem:  Active Hospital Problems    Diagnosis   • *Hypovolemic shock from GI bleeding (HCC) [R57.1]   • Acute respiratory failure with hypoxia (HCC) [J96.01]   • GI bleeding [K92.2]   • Aspiration pneumonia (HCC) [J69.0]   • Coagulopathy (HCC) [D68.9]   • Hypocalcemia [E83.51]   • Toxic metabolic encephalopathy [G92]   • End stage liver disease (HCC) [K72.90]   • History of alcohol dependence (HCC) [F10.21]   • Acute blood loss anemia [D62]   • Cocaine abuse (HCC) [F14.10]   • Hypokalemia [E87.6]   • Hypomagnesemia [E83.42]   • Tobacco dependence [F17.200]   Impression:  1. GI bleed esophageal varices s/p banding yesterday. PHG could also have been a source of bleeding.  2. Decompensated cirrhosis probably Etoh. HCV and HBV neg.   3. Intubated for airway protection.  4. Coagulopathy  5. Elevated ammonia.     Plan:  1. Can DC pantoprazole and go to q12 iv  2. Run octreotide another 24 hours.  3. NG ok for lactulose if he doesn't extubate.   4. Add zinc 220mg PO/NG QAM  5. INR in am.   Gi following. Critically ill.      Quality-Core Measures   Reviewed items::  Labs reviewed and Medications reviewed

## 2019-01-05 NOTE — PROGRESS NOTES
Critical Care Progress Note    Date of admission  1/3/2019    Chief Complaint  58 y.o. male admitted 1/3/2019 with hematemesis and AMS    Hospital Course    58-year-old gentleman with no known   medical history except for longstanding alcohol use, some form of possible   liver disease and per cousin a significant amount of weight loss over this   past year.  Patient lives apparently in [place], has a roommate, was   reportedly seen normal last night.  In and around 3 in the morning, he had   made some noise that the roommate responded to and he subsequently said that   he was okay, but did have apparently some emesis at that time.  Later this   morning, he was found by his roommate lying on the ground with what appeared   to be a large amount of bloody vomitus.  EMS was called and patient was   subsequently brought into [facility] for further evaluation.  At this time, he is   unresponsive, poorly arousable, will open eyes to deep sternal rub, but not   communicate or answer.  His cousin is at bedside, has a very minimal past   medical history and states that he does not divulge much, but has some form of   underlying liver disease.  Patient of note also does spend a moderate amount   of the year in [place] and had perhaps some kind of GI bleed while he was there   last.  She indicates that he was a heavy drinker, but believed that he had   stopped drinking for the last several months.        Interval Problem Update  Reviewed last 24 hour events:   - underwent EGD showing esophageal varcies and portal gastropathy requiring bands x 4   - awakens with agitation, Anila, intermittent following   - remains on levophed gtt @ 18   - CVP 9-11, good UOP   - NSR with frequent PVCs   - AF, nl WBC   - Hgb down to 7.8 this morning   - plts down to 135   - low K, phos   - octreotide and PPI gtts --> change to BID PPI   - trop improving   - B ischial/sacral pressure sores   - SBT planned   - day 3 rocephin/flagyl, BAL with GNRs   -  switch rally bag to p.o.    Review of Systems  Review of Systems   Unable to perform ROS: Intubated        Vital Signs for last 24 hours   Temp:  [36.8 °C (98.2 °F)-37.1 °C (98.8 °F)] 36.8 °C (98.2 °F)  Pulse:  [] 72  Resp:  [0-22] 18    Hemodynamic parameters for last 24 hours  CVP:  [5 MM HG-295 MM HG] 287 MM HG    Respiratory  Loza Vent Mode: APVCMV  Rate (breaths/min): 16  Vt Target (mL): 410  PEEP/CPAP: 8  FiO2: 30  P MEAN: 10  Control VTE (exp VT): 425 - SaO2 remains 100% on 30% FiO2    Physical Exam   Physical Exam   Constitutional: He appears well-developed. He appears cachectic. He is uncooperative.  Non-toxic appearance. He has a sickly appearance. He appears ill. He is sedated and intubated.   Chronically ill-appearing, cachectic male, becoming agitated   HENT:   Head: Normocephalic and atraumatic.   Nose: Nose normal.   Mouth/Throat: Mucous membranes are not pale and dry. No oropharyngeal exudate.   Endotracheal tube in position   Eyes: Pupils are equal, round, and reactive to light. Right eye exhibits no discharge. Left eye exhibits no discharge. Scleral icterus is present.   Neck: No JVD present. No thyromegaly present.   Right IJ central venous catheter without surrounding hematoma   Cardiovascular: Regular rhythm.   Occasional extrasystoles are present. Tachycardia present.  PMI is not displaced.  Exam reveals decreased pulses. Exam reveals no distant heart sounds.    Murmur (Flow) heard.  Pulses:       Radial pulses are 1+ on the right side, and 1+ on the left side.   Improving capillary refill   Pulmonary/Chest: No accessory muscle usage. No tachypnea. He is intubated. He has no decreased breath sounds. He has no wheezes. He has rhonchi in the right lower field and the left lower field. He has rales in the right lower field and the left lower field.   Abdominal: Soft. Bowel sounds are normal. He exhibits shifting dullness, distension, fluid wave and ascites. There is generalized  tenderness. There is no rebound and no guarding.   Tympanitic   Genitourinary: Penis normal.   Genitourinary Comments: Hendrickson catheter in place   Musculoskeletal: He exhibits edema. He exhibits no tenderness.   Lymphadenopathy:     He has no cervical adenopathy.   Neurological: He exhibits normal muscle tone.   Starting to awaken with intermittent following, agitated, moves all extremities without focal deficits   Skin: Skin is warm and dry. He is not diaphoretic. No erythema. There is pallor.   Jaundiced, spider angiomata   Psychiatric:   Unable to assess given current clinical condition   Nursing note and vitals reviewed.      Medications  Current Facility-Administered Medications   Medication Dose Route Frequency Provider Last Rate Last Dose   • potassium chloride in water (KCL) ivpb **Administer in ICU only** 20 mEq  20 mEq Intravenous Once Edwin Coronado M.D. 50 mL/hr at 01/05/19 0644 20 mEq at 01/05/19 0644   • potassium phosphates 30 mmol in D5W 500 mL ivpb  30 mmol Intravenous Once Jeremy M Gonda, M.D.       • zinc sulfate (ZINCATE) capsule 220 mg  220 mg Oral DAILY Hansel Da Silva M.D.       • pantoprazole (PROTONIX) injection 40 mg  40 mg Intravenous BID Hansel Da Silva M.D.       • norepinephrine (LEVOPHED) 8 mg in  mL Infusion  0-30 mcg/min Intravenous Continuous Geurrero Uribe M.D. 35.6 mL/hr at 01/05/19 0700 19 mcg/min at 01/05/19 0700   • K+ Scale: Goal of 4.5  1 Each Intravenous Q6HRS Guerrero Uribe M.D.   1 Each at 01/05/19 0600   • Rally Bag infusion   Intravenous Q24HR Guerrero Uribe M.D. 42 mL/hr at 01/04/19 0836     • NS infusion   Intravenous Continuous Jeremy M Gonda, M.D. 83 mL/hr at 01/05/19 0627     • cefTRIAXone (ROCEPHIN) 1 g in  mL IVPB  1 g Intravenous Q24HRS Iron Payne, D.O.   Stopped at 01/05/19 0533   • metroNIDAZOLE (FLAGYL) IVPB 500 mg  500 mg Intravenous Q8HRS Iron Payne, D.O.   Stopped at 01/05/19 0606   • octreotide  (SANDOSTATIN) 1,250 mcg in  mL Infusion  50 mcg/hr Intravenous Continuous Stanton Hearn M.D. 10 mL/hr at 01/04/19 1458 50 mcg/hr at 01/04/19 1458   • Respiratory Care per Protocol   Nebulization Continuous RT Stanton Hearn M.D.       • senna-docusate (PERICOLACE or SENOKOT S) 8.6-50 MG per tablet 2 Tab  2 Tab Oral BID Stanton Hearn M.D.   Stopped at 01/04/19 0600    And   • polyethylene glycol/lytes (MIRALAX) PACKET 1 Packet  1 Packet Oral QDAY PRN Stanton Hearn M.D.        And   • magnesium hydroxide (MILK OF MAGNESIA) suspension 30 mL  30 mL Oral QDAY PRN Stanton Hearn M.D.        And   • bisacodyl (DULCOLAX) suppository 10 mg  10 mg Rectal QDAY PRN Stanton Hearn M.D.       • MD Alert...ICU Electrolyte Replacement per Pharmacy   Other pharmacy to dose Stanton Hearn M.D.       • fentaNYL (SUBLIMAZE) injection 25 mcg  25 mcg Intravenous Q HOUR PRN Stanton Hearn M.D.        Or   • fentaNYL (SUBLIMAZE) injection 50 mcg  50 mcg Intravenous Q HOUR PRN Stanton Hearn M.D.        Or   • fentaNYL (SUBLIMAZE) injection 100 mcg  100 mcg Intravenous Q HOUR PRN Stanton Hearn M.D.   100 mcg at 01/04/19 1517   • ipratropium-albuterol (DUONEB) nebulizer solution  3 mL Nebulization Q2HRS PRN (RT) Stanton Hearn M.D.       • ipratropium-albuterol (DUONEB) nebulizer solution  3 mL Nebulization Q4HRS (RT) Stanton Hearn M.D.   3 mL at 01/05/19 0654   • insulin regular (HUMULIN R) injection 1-6 Units  1-6 Units Subcutaneous Q6HRS Stanton Hearn M.D.   Stopped at 01/03/19 1830    And   • glucose 4 g chewable tablet 16 g  16 g Oral Q15 MIN PRN Stanton Hearn M.D.        And   • dextrose 50% (D50W) injection 25 mL  25 mL Intravenous Q15 MIN PRN Stanton Hearn M.D.       • propofol (DIPRIVAN) injection  0-80 mcg/kg/min Intravenous Continuous Stanton Hearn M.D. 14.7 mL/hr at 01/05/19 0643 40 mcg/kg/min at 01/05/19 0643   • fentaNYL (SUBLIMAZE) 50 mcg/mL in 50mL (Continuous Infusion)    Intravenous Continuous Guerrero Uribe M.D. 1 mL/hr at 01/03/19 2157 50 mcg/hr at 01/03/19 2157   • lactulose 10 GM/15ML solution 300 mL  300 mL Rectal Q6HRS Stanton Hearn M.D.   300 mL at 01/05/19 0500       Fluids    Intake/Output Summary (Last 24 hours) at 01/05/19 0841  Last data filed at 01/05/19 0800   Gross per 24 hour   Intake           9072.6 ml   Output             2280 ml   Net           6792.6 ml       Laboratory  Recent Labs      01/04/19   0130  01/04/19   0717  01/05/19   0429   ISTATAPH  7.523*  7.503*  7.440   ISTATAPCO2  19.5*  25.4*  30.5   ISTATAPO2  131*  119*  100*   ISTATATCO2  17*  21  22   [id number]  99  99  98   ISTATARTHCO3  16.1*  20.0  20.8   ISTATARTBE  -6*  -3  -3   ISTATTEMP  97.7 F  99.2 F  98.7 F   ISTATFIO2  30  30  30   ISTATSPEC  Arterial  Arterial  Arterial   ISTATAPHTC  7.531*  7.497  7.439   [id number]  128*  121*  100*     Recent Labs      01/04/19   0554  01/04/19   1730  01/04/19   2329   TROPONINI  1.58*  1.19*  0.98*     Recent Labs      01/04/19   0319  01/04/19   0554  01/04/19   1145  01/04/19   1730  01/04/19   2329  01/05/19   0500   SODIUM  142  145  142   --    --   145   POTASSIUM  3.1*  2.9*  3.0*  2.9*  3.0*  3.2*   CHLORIDE  114*  115*  112   --    --   115*   CO2  17*  18*  21   --    --   24   BUN  48*  40*  35*   --    --   18   CREATININE  1.09  0.96  0.85   --    --   0.54   MAGNESIUM  1.0*  1.0*   --   2.4   --   2.1   PHOSPHORUS  3.7  3.2   --    --    --   1.7*   CALCIUM  6.8*  6.9*  7.8*   --    --   7.7*     Recent Labs      01/03/19   1432   01/04/19   0319  01/04/19   0554  01/04/19   1145  01/05/19   0500   ALTSGPT  34   --   38   --    --    --    ASTSGOT  60*   --   72*   --    --    --    ALKPHOSPHAT  98   --   70   --    --    --    TBILIRUBIN  2.4*   --   2.9*   --    --    --    LIPASE  10*   --    --    --    --    --    GLUCOSE  111*   < >  128*  122*  147*  129*    < > = values in this interval not displayed.     Recent Labs       01/03/19   1432 01/04/19 0319 01/04/19   0554  01/05/19   0500   WBC   --   10.4  11.8*  6.3   NEUTSPOLYS   --   75.60*  75.00*  60.20   LYMPHOCYTES   --   18.20*  18.70*  33.60   MONOCYTES   --   5.30  5.40  4.40   EOSINOPHILS   --   0.20  0.30  1.80   BASOPHILS   --   0.30  0.30  0.00   ASTSGOT  60*  72*   --    --    ALTSGPT  34  38   --    --    ALKPHOSPHAT  98  70   --    --    TBILIRUBIN  2.4*  2.9*   --    --      Recent Labs      01/03/19   1432 01/04/19 0319 01/04/19 0554   01/04/19   1730  01/04/19   2329  01/05/19   0500   RBC   --    --   2.35*  2.40*   --    --    --   2.37*   HEMOGLOBIN   --    < >  7.2*  7.6*   < >  7.9*  8.1*  7.8*   HEMATOCRIT   --    < >  20.9*  21.2*   < >  22.4*  22.4*  21.5*   PLATELETCT   --    --   121*  140*   --    --    --   135*   PROTHROMBTM  46.2*   --    --    --    --    --    --    --    APTT  55.1*   --    --    --    --    --    --    --    INR  4.98*   --    --    --    --    --    --    --     < > = values in this interval not displayed.       Imaging  X-Ray:  I have personally reviewed the images and compared with prior images. and My impression is: Low lung volumes with perihilar infiltrates, endotracheal tube and right IJ central venous catheter in good position  CT:    Reviewed CT abdomen 1/4  Diffuse colonic wall thickening is nonspecific but can be seen with infectious favored over inflammatory or ischemic colitis. Anasarca could also contribute    Right middle lobe groundglass and consolidation is compatible with bronchopneumonia favored over aspiration or edema    Cholelithiasis    Assessment/Plan  * Hypovolemic shock from GI bleeding (HCC)   Assessment & Plan    Status post fluid/colloid resuscitation  Continue to titrate norepinephrine to maintain mean arterial pressure greater than 60, start vasopressin drip  Serial CBC with conservative transfusion strategy being hemoglobin greater than 7  Trend CVP with goal 10-12, additional colloid today  with albumin bolus  Trend urine output, vital signs closely for adequacy of resuscitation     GI bleeding   Assessment & Plan    Secondary to esophageal varices and portal gastropathy status post banding 1/4  Continue Protonix twice daily IV and octreotide drip  Serial CBC  Rocephin times 7 days     Acute respiratory failure with hypoxia (HCC)   Assessment & Plan    Intubated 1/3  Continue full mechanical ventilatory support, titrate FiO2 to goal SaO2 greater than 92%  RT/O2 protocols  Start SBT and mobilization today  Discontinue propofol and use fentanyl drip with Precedex as needed     Hypocalcemia   Assessment & Plan    Repleted, currently within normal limits when corrected for low albumin  Monitor daily     Coagulopathy (HCC)   Assessment & Plan    Secondary to alcohol abuse  Recheck in morning     Aspiration pneumonia (HCC)   Assessment & Plan    Continue Rocephin and Flagyl times 5 days  Follow-up on final cultures (currently with gram-negative rods)     Acute blood loss anemia   Assessment & Plan    Serial CBC  Conservative transfusion strategy with goal hemoglobin greater than 7.0     History of alcohol dependence (HCC)   Assessment & Plan    Vitamin supplementation (okay to change from IV to p.o. once enteral access obtained)  Monitor for alcohol withdrawal syndrome  Eventual alcohol cessation education to be provided once patient is extubated and interviewable     End stage liver disease (HCC)   Assessment & Plan    Decompensated  Gastroenterology following  Monitor synthetic function  Lactulose, start rifaximin, Zinc  Not able to tolerate diuretic nor propanolol at this time given shock     Toxic metabolic encephalopathy   Assessment & Plan    Uncontrolled  Limit sedatives and monitor neurologic function closely  Continue to correct underlying metabolic conditions     Severe protein-calorie malnutrition (HCC)   Assessment & Plan    Nutritionist consultation  Start enteral nutrition if okay with GI      Elevated troponin   Assessment & Plan    Secondary to demand ischemia -improving  Not an antiplatelet candidate     Hypophosphatemia   Assessment & Plan    Repletion and monitor closely     Hypomagnesemia   Assessment & Plan    Repleted, monitor     Hypokalemia   Assessment & Plan    Repletion again today and monitor closely     Cocaine abuse (HCC)   Assessment & Plan    Drug cessation education to be provided once patient extubated   Monitor for potential withdrawal syndrome     Tobacco dependence   Assessment & Plan    Tobacco cessation education to be provided once extubated  Nicotine patch     Full code    VTE:  Contraindicated  Ulcer: PPI  Lines: Central Line  Ongoing indication addressed and Hendrickson Catheter  Ongoing indication addressed    I have performed a physical exam and reviewed and updated ROS and Plan today (1/5/2019). In review of yesterday's note (1/4/2019), there are no changes except as documented above.     Discussed patient condition and risk of morbidity and/or mortality with Family, RN, RT, Pharmacy, , Charge nurse / hot rounds, GI and Dr. Tom Chavez  The patient remains critically ill.  Critical care time = 93 minutes in directly providing and coordinating critical care and extensive data review.  No time overlap and excludes procedures.

## 2019-01-05 NOTE — PROGRESS NOTES
[facility] Medicine Daily Progress Note    Date of Service  1/5/2019    Chief Complaint  58 y.o. male admitted 1/3/2019 with altered mentation    Hospital Course    Hx ETOH.  Found by room mate altered on the floor with a large amount of bloody emesis.  Intubated on arrival for airway protection.        Interval Problem Update    Agitated follows intermittently  SR with PVC's  SBP 90's  CVP 8-12  Afebrile  Starting precedex  Levo at 15  Octreotide drip  VD#3   SBT for 20mins became apneic  Day 3 ceft/flagyl  Rifaximin        Consultants/Specialty  GI  Pulmonology    Code Status  full    Disposition  ICU    Review of Systems  Review of Systems   Unable to perform ROS: Intubated        Physical Exam  Temp:  [36.8 °C (98.2 °F)-37.1 °C (98.8 °F)] 36.8 °C (98.2 °F)  Pulse:  [] 72  Resp:  [0-22] 18    Physical Exam   Constitutional: He appears well-developed and well-nourished.   HENT:   Head: Normocephalic and atraumatic.   Right Ear: External ear normal.   Left Ear: External ear normal.   Mouth/Throat: No oropharyngeal exudate.   Eyes: Conjunctivae are normal. Right eye exhibits no discharge. Left eye exhibits no discharge. No scleral icterus.   Neck: Neck supple. No JVD present. No tracheal deviation present.   Cardiovascular: Normal rate and regular rhythm.  Exam reveals no gallop and no friction rub.    No murmur heard.  Pulmonary/Chest: Effort normal and breath sounds normal. No stridor. No respiratory distress. He has no wheezes. He has no rales. He exhibits no tenderness.   Intubated   Abdominal: Soft. Bowel sounds are normal. He exhibits no distension. There is no tenderness. There is no rebound.   Musculoskeletal: He exhibits no edema or tenderness.   Neurological: No cranial nerve deficit. He exhibits normal muscle tone.   Exam limited by sedation no focal deficits noted   Skin: Skin is warm and dry. He is not diaphoretic. No cyanosis or erythema. Nails show no clubbing.   Psychiatric:   Sedated   Nursing  note and vitals reviewed.      Fluids    Intake/Output Summary (Last 24 hours) at 01/05/19 0950  Last data filed at 01/05/19 0800   Gross per 24 hour   Intake           9072.6 ml   Output             2280 ml   Net           6792.6 ml       Laboratory  Recent Labs      01/04/19   0319  01/04/19   0554   01/04/19   1730  01/04/19   2329  01/05/19   0500   WBC  10.4  11.8*   --    --    --   6.3   RBC  2.35*  2.40*   --    --    --   2.37*   HEMOGLOBIN  7.2*  7.6*   < >  7.9*  8.1*  7.8*   HEMATOCRIT  20.9*  21.2*   < >  22.4*  22.4*  21.5*   MCV  88.9  88.3   --    --    --   90.7   MCH  30.6  31.7   --    --    --   32.9   MCHC  34.4  35.8*   --    --    --   36.3*   RDW  53.5*  53.1*   --    --    --   56.1*   PLATELETCT  121*  140*   --    --    --   135*   MPV  9.8  9.6   --    --    --   9.7    < > = values in this interval not displayed.     Recent Labs      01/04/19   0554  01/04/19   1145  01/04/19   1730  01/04/19   2329  01/05/19   0500   SODIUM  145  142   --    --   145   POTASSIUM  2.9*  3.0*  2.9*  3.0*  3.2*   CHLORIDE  115*  112   --    --   115*   CO2  18*  21   --    --   24   GLUCOSE  122*  147*   --    --   129*   BUN  40*  35*   --    --   18   CREATININE  0.96  0.85   --    --   0.54   CALCIUM  6.9*  7.8*   --    --   7.7*     Recent Labs      01/03/19   1432   APTT  55.1*   INR  4.98*         Recent Labs      01/04/19   2329   TRIGLYCERIDE  96       Imaging  DX-CHEST-PORTABLE (1 VIEW)   Final Result         1. Stable lines and tubes.   2. Hypoinflation. No new consolidation or pleural effusions.      EC-ECHOCARDIOGRAM COMPLETE W/O CONT   Final Result      DX-CHEST-PORTABLE (1 VIEW)   Final Result      Increasing middle lobe opacification is worrisome for pneumonia      CT-HEAD W/O   Final Result      Moderate atrophy without acute intracranial abnormality identified      Chronic right maxillary sinus inflammatory disease with possible prior fracture      CT-RENAL COLIC EVALUATION(A/P W/O)    Final Result      Diffuse colonic wall thickening is nonspecific but can be seen with infectious favored over inflammatory or ischemic colitis. Anasarca could also contribute      Right middle lobe groundglass and consolidation is compatible with bronchopneumonia favored over aspiration or edema      Cholelithiasis      DX-CHEST-PORTABLE (1 VIEW)   Final Result      Endotracheal tube and right IJ central line as described above.      Hypoventilatory change.           Assessment/Plan  GI bleeding   Assessment & Plan    Status post banding of esophageal varices  On IV Protonix twice daily and octreotide  On ceftriaxone  Continue close clinical monitoring  GI following     Acute respiratory failure with hypoxia (HCC)   Assessment & Plan    Intubated for airway protection 1/3  Secondary to aspiration pneumonia  Continue vent management per critical care discussed with Dr. Gonda     Aspiration pneumonia (HCC)   Assessment & Plan    Secondary to Klebsiella pneumonia   Continue ceftriaxone and Flagyl        Acute blood loss anemia   Assessment & Plan    Continue to monitor hemoglobin and transfuse if less than 7  No clinical signs of bleeding at this time     History of alcohol dependence (HCC)   Assessment & Plan    Obtained further history post extubation  No signs of withdrawal at this time     End stage liver disease (HCC)   Assessment & Plan    Likely secondary to history of alcoholism  Hepatitis serology negative       Toxic metabolic encephalopathy   Assessment & Plan    Multifactorial from GI bleed  Hepatic encephalopathy    Continue lactulose, start rifaximin  Minimize sedating agents     Elevated troponin   Assessment & Plan    Likely demand ischemia   echo normal EF and LV     Hypophosphatemia   Assessment & Plan    Replete and monitor      Hypokalemia   Assessment & Plan    On K scale     Cocaine abuse (HCC)   Assessment & Plan    Noted on urine drug screen  Counseled on cessation when more clinically  stable     Tobacco dependence   Assessment & Plan    Counseled on cessation post extubation          Overall prognosis guarded discussed with sister at bedside    VTE prophylaxis: SCD

## 2019-01-05 NOTE — DIETARY
Nutrition Services: Nutrition Support Assessment  Day 2 of admit.  Filiberto Barajas is a 58 y.o. male with admitting DX of Shock, GI bleeding     Current problem list:  1. Hypovolemic shock from GI bleeding  2. Acute respiratory failure with hypoxia  3. GI bleeding  4. Aspiration pneumonia  5. Coagulopathy  6. Hypocalcemia  7. Toxic metabolic encephalopathy  8. End stage liver disease  9. History of alcohol dependence  10. Acute blood loss anemia  11. Hypophosphatemia  12. Elevated troponin  13. Severe protein-calorie malnutrition  14. Cocaine abuse  15. Hypokalemia  16. Hypomagnesemia  17. Tobacco dependence     Assessment:  Estimated Nutritional Needs: based on: Ht: 177.8 cm, Wt /3: 58.5 kg via bed scale (suspected dry wt), IBW: 75.3 kg, BMI: 18.51  Current wt : 70.8 kg via bed scale - wt gained noted suspect related to fluids, per I/Os pt +13.2 L     Calculation/Equation: REE per MSJ x 1.2 = 1689 kcal/day; RMR per PSU (vent L/min 7.9, T max 24 hours 36.9) = 1545 kcal/day  Total Calories / day: 1400 - 1700  (Calories / k - 29)  Total Grams Protein / day: 70 - 88  (Grams Protein / k.2 - 1.5)  Total Fluids ml / day: 1758.1 ml (mL / k)    Evaluation:   1. Consult received for TF assessment. Pt is intubated and in need of nutrition support.    2. Enteral access: Gastric Cortrak   3. Labs: Glucose 129, Phosphorus 1.7  4. Pt at risk for refeeding. Per H&P Aunt reported pt is a heavy drinker and smoker.   5. Meds: rocephin, humulin R, K+ scale, lactulose, electrolyte replacement, flagyl, protonix, KCL, potassium phosphates, zincate, precedex @ 0.5 mcg/kg/hr, levophed @ 10 mcg/min, vasostrict @ 0.03 units/min  6. NS infusion @ 83 ml/hr and rally bag infusion @ 42 ml/hr  7. Due to pt receiving pressors, fiber-free TF formula appropriate.      Recommendations/Plan:  Start Impact Peptide 1.5 @ 25 ml/hr and advance per protocol to 40 ml/hr (goal rate) to provide 1440kcal (25 kcal/kg), 90 grams protein (1.5  gm/kg), and 739 ml free water per day.   Fluids per MD.     Monitor for refeeding: Order BMP w/ Mg and Phos x 7 days. Replete K, Phos and Mg prn. Supplement 100 mg Thiamine x 7 days to reduce risk of refeeding     RD following

## 2019-01-05 NOTE — PROGRESS NOTES
Cortrak Placement    Tube Team verified patient name and medical record number prior to tube placement.  Cortrak tube (55 inches, 10 [place]amese) placed at 69 cm in right nare.  Per Cortrak picture, tube appears to be in the stomach.  Nursing Instructions: Awaiting KUB to confirm placement before use for medications or feeding. Once placement confirmed, flush tube with 30 ml of water, and then remove and save stylet, in patient medication drawer.

## 2019-01-05 NOTE — CARE PLAN
Problem: Communication  Goal: The ability to communicate needs accurately and effectively will improve    Intervention: Educate patient and significant other/support system about the plan of care, procedures, treatments, medications and allow for questions  Family arrived today.  Extensive communication with family about plan of care and of who to allow medical information to be released to.  Per family, only the two sisters and friend (dick) allowed to know of any medical information.    Note in patient's chart with all this information.  Noc RN made aware.       Problem: Bowel/Gastric:  Goal: Normal bowel function is maintained or improved  EGD performed today by GI MD.  7 bands placed.  Per GI, okay to place ng tube(s) prn tomorrow 1/5

## 2019-01-05 NOTE — CARE PLAN
Problem: Ventilation Defect:  Goal: Ability to achieve and maintain unassisted ventilation or tolerate decreased levels of ventilator support  Adult Ventilation Update    Total Vent Days: 3    Patient Lines/Drains/Airways Status    Active Airway     Name: Placement date: Placement time: Site: Days:    Airway ETT Oral 8.0 01/03/19 1740   Oral   3                    Plateau Pressure (Q Shift): 15 (01/05/19 0221)  Static Compliance (ml / cm H2O): 85.7 (01/05/19 0221)      Sputum/Suction   Cough: Productive (01/05/19 0000)  Sputum Amount: Small (01/05/19 0000)  Sputum Color: White;Yellow (01/05/19 0000)  Sputum Consistency: Thick;Thin (01/05/19 0000)    Mobility  Level of Mobility: Level I (01/04/19 0400)  Activity Performed: Unable to mobilize (01/04/19 0800)  Pt Calls for Assistance: No (01/04/19 0400)  Reason Not Mobilized: Unstable condition (01/04/19 0800)  Mobilization Comments:  (BP unstable, pt agitated RASS +4 during turns.) (01/04/19 0800)    Events/Summary/Plan: Patient's SBT was held due to surgery. APVCMV 16, 410, +8, 30%. Continue to support and monitor respiratory status.

## 2019-01-05 NOTE — DISCHARGE PLANNING
Anticipated Discharge Disposition: TBD    Action: Lsw spoke to bedside RN. Lsw updated RN as last Epic note indicates regarding family meeting about pt and his d/c, INS, AD, etc.    RN noted pt's Atty was brought up to unit by his eldest sister. The Atty was going to have pt sign documents to release his assets. RN advised pt is not able to sign anything right now. The Atty and family were made aware pt will not be signing anything until he is A/O x4 and can make his own decisions.     Barriers to Discharge: TBD    Plan: f/u w/ medical team

## 2019-01-05 NOTE — CARE PLAN
Problem: Ventilation Defect:  Goal: Ability to achieve and maintain unassisted ventilation or tolerate decreased levels of ventilator support    Intervention: Support and monitor invasive and noninvasive mechanical ventilation  VD 2  7.5-25  16  410  +8  30%  DUO Q4

## 2019-01-06 PROBLEM — R57.9 SHOCK (HCC): Status: ACTIVE | Noted: 2019-01-01

## 2019-01-06 NOTE — CARE PLAN
Problem: Respiratory:  Goal: Respiratory status will improve  Outcome: PROGRESSING SLOWER THAN EXPECTED  Patient remains intubated. Failed SBT this am.  Intervention: Assess and monitor pulmonary status  Completed and documented.      Problem: Hemodynamic Status  Goal: Vital Signs and Fluid Balance Management  Outcome: PROGRESSING SLOWER THAN EXPECTED  Patient remains on pressor support. Currently no signs of active GI bleeding.  Intervention: Cardiac Monitoring, Pulse Oximetry  Completed and documented.  Intervention: Hemodynamic Monitoring  CVP 8-14.  Intervention: Monitor I & O, Manage IV fluids and IV infusions  Completed and documented.  Intervention: Assess peripheral pulses and capillary refill  Completed and documented.  Intervention: Position Patient for Maximum Circulation/Cardiac Output  Completed and documented.

## 2019-01-06 NOTE — RESPIRATORY CARE
Rapid Shallow Breathing Index (RR/VT): 14 (01/06/19 0842)  RR (bpm): 10 (01/06/19 0842)  Spontaneous VE: 6.3 (01/06/19 0842)  Spontaneous VT: 616 (01/06/19 0842)    Barriers to Wean: Other (Comments) (recently intubated)  Weaning Trial Stopped due to:: Pt weaned for 1 hour and returned to rest settings per protocol

## 2019-01-06 NOTE — PROGRESS NOTES
[facility] Medicine Daily Progress Note    Date of Service  1/6/2019    Chief Complaint  58 y.o. male admitted 1/3/2019 with altered mentation    Hospital Course    Hx ETOH.  Found by room mate altered on the floor with a large amount of bloody emesis.  Intubated on arrival for airway protection.        Interval Problem Update        TF at goal  On vasopressin off levo  Awake and following commands  On octreotide  Day 4 ceft/flagyl                Consultants/Specialty  GI  Pulmonology    Code Status  DNR/DNI    Disposition  ICU    Review of Systems  Review of Systems   Unable to perform ROS: Intubated        Physical Exam  Temp:  [37 °C (98.6 °F)-37.6 °C (99.7 °F)] 37.2 °C (98.9 °F)  Pulse:  [73-95] 92  Resp:  [3-20] 19    Physical Exam   Constitutional: He appears well-developed and well-nourished.   Thin male   HENT:   Head: Normocephalic and atraumatic.   Mouth/Throat: Oropharynx is clear and moist.   cortrak in place   Eyes: Conjunctivae are normal. Right eye exhibits no discharge. Left eye exhibits no discharge.   Neck: Neck supple. No JVD present. No tracheal deviation present.   Cardiovascular: Normal rate, regular rhythm and normal heart sounds.    Pulmonary/Chest: Effort normal and breath sounds normal. No respiratory distress. He exhibits no tenderness.   Intubated   Abdominal: Soft. Bowel sounds are normal. He exhibits distension. There is no tenderness. There is no rebound.   Musculoskeletal: He exhibits edema. He exhibits no tenderness.   Neurological: He is alert. No cranial nerve deficit. He exhibits normal muscle tone.   Generalized weakness but following commands   Skin: Skin is warm and dry. No rash noted. He is not diaphoretic. No cyanosis. Nails show no clubbing.   Psychiatric: He is slowed.   Nursing note and vitals reviewed.      Fluids    Intake/Output Summary (Last 24 hours) at 01/06/19 0948  Last data filed at 01/06/19 0800   Gross per 24 hour   Intake           6003.4 ml   Output              2260 ml   Net           3743.4 ml       Laboratory  Recent Labs      01/04/19   0554   01/05/19   0500   01/05/19   1740  01/05/19   2300  01/06/19   0517   WBC  11.8*   --   6.3   --    --    --   4.9   RBC  2.40*   --   2.37*   --    --    --   2.38*   HEMOGLOBIN  7.6*   < >  7.8*   < >  7.3*  7.3*  7.4*   HEMATOCRIT  21.2*   < >  21.5*   < >  21.2*  21.6*  21.9*   MCV  88.3   --   90.7   --    --    --   92.0   MCH  31.7   --   32.9   --    --    --   31.1   MCHC  35.8*   --   36.3*   --    --    --   33.8   RDW  53.1*   --   56.1*   --    --    --   62.4*   PLATELETCT  140*   --   135*   --    --    --   124*   MPV  9.6   --   9.7   --    --    --   9.8    < > = values in this interval not displayed.     Recent Labs      01/04/19   1145   01/05/19   0500   01/05/19   1740  01/05/19   2300  01/06/19   0517   SODIUM  142   --   145   --    --    --   143   POTASSIUM  3.0*   < >  3.2*   < >  3.6  3.7  3.9   CHLORIDE  112   --   115*   --    --    --   116*   CO2  21   --   24   --    --    --   22   GLUCOSE  147*   --   129*   --    --    --   115*   BUN  35*   --   18   --    --    --   6*   CREATININE  0.85   --   0.54   --    --    --   0.42*   CALCIUM  7.8*   --   7.7*   --    --    --   7.8*    < > = values in this interval not displayed.     Recent Labs      01/03/19   1432   APTT  55.1*   INR  4.98*         Recent Labs      01/04/19   2329   TRIGLYCERIDE  96       Imaging  DX-CHEST-PORTABLE (1 VIEW)   Final Result         1. Lines and tubes as above.   2. Hypoinflation with mild left basilar atelectasis. No new consolidation or pleural effusions.      DX-ABDOMEN FOR TUBE PLACEMENT   Final Result      Enteric tube terminates over the stomach.      DX-CHEST-PORTABLE (1 VIEW)   Final Result         1. Stable lines and tubes.   2. Hypoinflation. No new consolidation or pleural effusions.      EC-ECHOCARDIOGRAM COMPLETE W/O CONT   Final Result      DX-CHEST-PORTABLE (1 VIEW)   Final Result      Increasing middle  lobe opacification is worrisome for pneumonia      CT-HEAD W/O   Final Result      Moderate atrophy without acute intracranial abnormality identified      Chronic right maxillary sinus inflammatory disease with possible prior fracture      CT-RENAL COLIC EVALUATION(A/P W/O)   Final Result      Diffuse colonic wall thickening is nonspecific but can be seen with infectious favored over inflammatory or ischemic colitis. Anasarca could also contribute      Right middle lobe groundglass and consolidation is compatible with bronchopneumonia favored over aspiration or edema      Cholelithiasis      DX-CHEST-PORTABLE (1 VIEW)   Final Result      Endotracheal tube and right IJ central line as described above.      Hypoventilatory change.           Assessment/Plan  GI bleeding   Assessment & Plan    Status post banding of esophageal varices  Ceftriaxone and Protonix     Acute respiratory failure with hypoxia (HCC)   Assessment & Plan    Intubated for airway protection 1/3  Secondary to aspiration pneumonia    Discussed with Dr. Gonda after and after discussion with patient's sisters at the bedside they elected to proceed with one-way extubation     Aspiration pneumonia (HCC)   Assessment & Plan    Secondary to Klebsiella pneumonia     Continue ceftriaxone and Flagyl       Acute blood loss anemia   Assessment & Plan    Bleeding resolved  Hemoglobin 7.4 stable continue to monitor     History of alcohol dependence (HCC)   Assessment & Plan    Obtained further history post extubation  No signs of withdrawal at this time     End stage liver disease (HCC)   Assessment & Plan    Likely secondary to history of alcoholism  Hepatitis serology negative       Toxic metabolic encephalopathy   Assessment & Plan    Multifactorial from GI bleed  Hepatic encephalopathy    Continue lactulose and rifaximin     Severe protein-calorie malnutrition (HCC)   Assessment & Plan    On tube feeding  Reevaluate post extubation     Elevated troponin    Assessment & Plan    Likely demand ischemia   echo normal EF and LV     Hypophosphatemia   Assessment & Plan    Replete and monitor      Hypomagnesemia   Assessment & Plan    Replete with mag sulfate and monitor     Hypokalemia   Assessment & Plan    Continue K scale     Cocaine abuse (HCC)   Assessment & Plan    Noted on urine drug screen   on cessation when more clinically stable     Tobacco dependence   Assessment & Plan     on cessation post extubation            Addendum:  Patient evaluated post extubation, he is alert oriented to self denies pain.  He is hypotensive systolic 60s/70s.  Sisters at bedside wanted to continue off pressors  continue antibiotics and other meds and reevaluate if he does not improve they are planning to transition to comfort care  We will consult palliative care    VTE prophylaxis: SCD

## 2019-01-06 NOTE — PROGRESS NOTES
12 hour chart check    · 2 RN skin check complete with REBECCA Denny.  · Devices in place pulse ox monitor, cardiac monitor, ET tube with holister, SCD's, bilateral wrist restraints, peripheral IVs, central IV, rectal trumpet, claire catheter, and rt nare cortrak.  · Skin assessed under devices completed and documented.  · Wounds documented, all current wounds are prior to arrival, MD aware.  · The following interventions in place mepilex, biaton, pillows for support and repositioning, pillows to float heels, and waffle cushion.

## 2019-01-06 NOTE — PROGRESS NOTES
Critical Care Progress Note    Date of admission  1/3/2019    Chief Complaint  58 y.o. male admitted 1/3/2019 with hematemesis and AMS    Hospital Course    58-year-old gentleman with no known   medical history except for longstanding alcohol use, some form of possible   liver disease and per cousin a significant amount of weight loss over this   past year.  Patient lives apparently in [place], has a roommate, was   reportedly seen normal last night.  In and around 3 in the morning, he had   made some noise that the roommate responded to and he subsequently said that   he was okay, but did have apparently some emesis at that time.  Later this   morning, he was found by his roommate lying on the ground with what appeared   to be a large amount of bloody vomitus.  EMS was called and patient was   subsequently brought into [facility] for further evaluation.  At this time, he is   unresponsive, poorly arousable, will open eyes to deep sternal rub, but not   communicate or answer.  His cousin is at bedside, has a very minimal past   medical history and states that he does not divulge much, but has some form of   underlying liver disease.  Patient of note also does spend a moderate amount   of the year in [place] and had perhaps some kind of GI bleed while he was there   last.  She indicates that he was a heavy drinker, but believed that he had   stopped drinking for the last several months.        Interval Problem Update  Reviewed last 24 hour events:   - off NE gtt this morning, remains on vasopressin 0.03   - octreotide gtt, IV PPI BID   - Hgb remains unchanged at 7.4   - plts down to 124   - low K/Phos/Mag   - awakens and follows off precedex and on fent @ 50   - AF   - rachelle TFs   - last BM 1/5   - good UOP   - SBT x 1 hr, RSBI 14   - day 4 abx with rocephin/flagyl, BAL with klebsiella    Review of Systems  Review of Systems   Unable to perform ROS: Intubated        Vital Signs for last 24 hours   Temp:  [37 °C (98.6  °F)-37.6 °C (99.7 °F)] 37.2 °C (98.9 °F)  Pulse:  [73-95] 85  Resp:  [3-20] 17   BP: /57-62    Hemodynamic parameters for last 24 hours  CVP:  [13 MM HG-287 MM HG] 13 MM HG    Respiratory  Loza Vent Mode: APVCMV  Rate (breaths/min): 16  Vt Target (mL): 410  PEEP/CPAP: 8  FiO2: 30  P Support: 5  P MEAN: 10  Length of Weaning Trial (Hours): 1  Control VTE (exp VT): 409 - SaO2 96-98% on 30% FiO2    Physical Exam   Physical Exam   Constitutional: He appears well-developed. He appears cachectic. He is cooperative.  Non-toxic appearance. He has a sickly appearance. He appears ill. No distress. He is intubated.   Chronically ill-appearing, cachectic male   HENT:   Head: Normocephalic and atraumatic.   Nose: Nose normal.   Mouth/Throat: Oropharynx is clear and moist. Mucous membranes are not pale and dry.   Endotracheal tube and nasal cor track in position   Eyes: Pupils are equal, round, and reactive to light. Scleral icterus is present.   Neck: Neck supple. No JVD present. No tracheal deviation present.   Right IJ central venous catheter without surrounding hematoma   Cardiovascular: Normal rate, regular rhythm and intact distal pulses.   Occasional extrasystoles are present. PMI is not displaced.  Exam reveals no distant heart sounds and no decreased pulses.    No murmur heard.  Remains in shock on vasopressor drip   Pulmonary/Chest: No accessory muscle usage. No tachypnea. He is intubated. No respiratory distress. He has no decreased breath sounds. He has no wheezes. He has rhonchi in the right lower field and the left lower field. He has no rales.   Abdominal: Soft. Bowel sounds are normal. He exhibits distension. He exhibits no shifting dullness, no fluid wave and no ascites. There is no tenderness. There is no rebound and no guarding.   Remains tympanitic   Genitourinary: Penis normal.   Genitourinary Comments: Hendrickson catheter in place   Musculoskeletal: He exhibits edema (Improving). He exhibits no  tenderness or deformity.   Neurological: He is alert. No cranial nerve deficit. He exhibits normal muscle tone.   Follows commands, moves all extremities, denying pain, mouth words   Skin: Skin is warm and dry. No rash noted. He is not diaphoretic. There is pallor.   Jaundiced, spider angiomata   Psychiatric:   Unable to assess given current clinical condition   Nursing note and vitals reviewed.      Medications  Current Facility-Administered Medications   Medication Dose Route Frequency Provider Last Rate Last Dose   • zinc sulfate (ZINCATE) capsule 220 mg  220 mg Oral DAILY Hansel Da Silva M.D.   220 mg at 01/06/19 0523   • pantoprazole (PROTONIX) injection 40 mg  40 mg Intravenous BID Hansel Da Silva M.D.   40 mg at 01/06/19 0526   • Pharmacy Consult: Enteral tube feeding - review meds/change route/product selection   Other PRN Jeremy M Gonda, M.D.       • vasopressin (VASOSTRICT) 20 Units in  mL Infusion  0.03 Units/min Intravenous Continuous Jeremy M Gonda, M.D. 9 mL/hr at 01/06/19 0700 0.03 Units/min at 01/06/19 0700   • dexmedetomidine (PRECEDEX) 400 mcg/100mL NS premix infusion  0.1-1.5 mcg/kg/hr Intravenous Continuous Jeremy M Gonda, M.D.   Stopped at 01/06/19 0730   • lactulose 20 GM/30ML solution 30 mL  30 mL Feeding Tube TID Jeremy M Gonda, M.D.   30 mL at 01/06/19 0524   • riFAXIMin (XIFAXAN) tablet 550 mg  550 mg Feeding Tube BID Jeremy M Gonda, M.D.   550 mg at 01/06/19 0523   • thiamine tablet 100 mg  100 mg Feeding Tube DAILY Jeremy M Gonda, M.D.   100 mg at 01/06/19 0523   • folic acid (FOLVITE) tablet 1 mg  1 mg Feeding Tube DAILY Jeremy M Gonda, M.D.   1 mg at 01/06/19 0524   • multivitamin (THERAGRAN) tablet 1 Tab  1 Tab Feeding Tube DAILY Jeremy M Gonda, M.D.   1 Tab at 01/06/19 0524   • norepinephrine (LEVOPHED) 8 mg in  mL Infusion  0-30 mcg/min Intravenous Continuous Guerrero Uribe M.D.   Stopped at 01/06/19 0705   • K+ Scale: Goal of 4.5  1 Each Intravenous Q6HRS Guerrero  AMBER Uribe M.D.   1 Each at 01/06/19 0600   • NS infusion   Intravenous Continuous Jeremy M Gonda, M.D. 83 mL/hr at 01/06/19 0705     • cefTRIAXone (ROCEPHIN) 1 g in  mL IVPB  1 g Intravenous Q24HRS Iron Payne D.O.   Stopped at 01/06/19 0555   • metroNIDAZOLE (FLAGYL) IVPB 500 mg  500 mg Intravenous Q8HRS Iron Payne D.O.   Stopped at 01/06/19 0623   • octreotide (SANDOSTATIN) 1,250 mcg in  mL Infusion  50 mcg/hr Intravenous Continuous Stanton Hearn M.D. 10 mL/hr at 01/06/19 0700 50 mcg/hr at 01/06/19 0700   • Respiratory Care per Protocol   Nebulization Continuous RT Stanton Hearn M.D.       • MD Alert...ICU Electrolyte Replacement per Pharmacy   Other pharmacy to dose Stanton Hearn M.D.       • fentaNYL (SUBLIMAZE) injection 25 mcg  25 mcg Intravenous Q HOUR PRN Stanton Hearn M.D.        Or   • fentaNYL (SUBLIMAZE) injection 50 mcg  50 mcg Intravenous Q HOUR PRN Stanton Hearn M.D.        Or   • fentaNYL (SUBLIMAZE) injection 100 mcg  100 mcg Intravenous Q HOUR PRN Stanton Hearn M.D.   100 mcg at 01/05/19 1040   • ipratropium-albuterol (DUONEB) nebulizer solution  3 mL Nebulization Q2HRS PRN (RT) Stanton Hearn M.D.   3 mL at 01/05/19 1624   • ipratropium-albuterol (DUONEB) nebulizer solution  3 mL Nebulization Q4HRS (RT) Stanton Hearn M.D.   3 mL at 01/06/19 0722   • insulin regular (HUMULIN R) injection 1-6 Units  1-6 Units Subcutaneous Q6HRS Stanton Hearn M.D.   Stopped at 01/03/19 1830    And   • glucose 4 g chewable tablet 16 g  16 g Oral Q15 MIN PRN Stanton Hearn M.D.        And   • dextrose 50% (D50W) injection 25 mL  25 mL Intravenous Q15 MIN PRN Stanton Hearn M.D.       • fentaNYL (SUBLIMAZE) 50 mcg/mL in 50mL (Continuous Infusion)   Intravenous Continuous Guerrero Uribe M.D. 1 mL/hr at 01/06/19 0721 50 mcg/hr at 01/06/19 0721       Fluids    Intake/Output Summary (Last 24 hours) at 01/06/19 0859  Last data filed at 01/06/19 0800    Gross per 24 hour   Intake           6003.4 ml   Output             2260 ml   Net           3743.4 ml       Laboratory  Recent Labs      01/04/19   0717  01/05/19   0429  01/06/19   0459   ISTATAPH  7.503*  7.440  7.384*   ISTATAPCO2  25.4*  30.5  34.3   ISTATAPO2  119*  100*  89*   ISTATATCO2  21  22  21   [id number]  99  98  97   ISTATARTHCO3  20.0  20.8  20.5   ISTATARTBE  -3  -3  -4   ISTATTEMP  99.2 F  98.7 F  98.9 F   ISTATFIO2  30  30  30   ISTATSPEC  Arterial  Arterial  Arterial   ISTATAPHTC  7.497  7.439  7.382*   [id number]  121*  100*  90*     Recent Labs      01/04/19   0554  01/04/19   1730  01/04/19   2329   TROPONINI  1.58*  1.19*  0.98*     Recent Labs      01/04/19   0554  01/04/19   1145  01/04/19   1730   01/05/19   0500   01/05/19   1740  01/05/19   2300  01/06/19   0517   SODIUM  145  142   --    --   145   --    --    --   143   POTASSIUM  2.9*  3.0*  2.9*   < >  3.2*   < >  3.6  3.7  3.9   CHLORIDE  115*  112   --    --   115*   --    --    --   116*   CO2  18*  21   --    --   24   --    --    --   22   BUN  40*  35*   --    --   18   --    --    --   6*   CREATININE  0.96  0.85   --    --   0.54   --    --    --   0.42*   MAGNESIUM  1.0*   --   2.4   --   2.1   --    --    --   1.7   PHOSPHORUS  3.2   --    --    --   1.7*   --    --    --   1.6*   CALCIUM  6.9*  7.8*   --    --   7.7*   --    --    --   7.8*    < > = values in this interval not displayed.     Recent Labs      01/03/19   1432   01/04/19   0319   01/04/19   1145  01/05/19   0500  01/06/19   0517   ALTSGPT  34   --   38   --    --    --    --    ASTSGOT  60*   --   72*   --    --    --    --    ALKPHOSPHAT  98   --   70   --    --    --    --    TBILIRUBIN  2.4*   --   2.9*   --    --    --    --    LIPASE  10*   --    --    --    --    --    --    GLUCOSE  111*   < >  128*   < >  147*  129*  115*    < > = values in this interval not displayed.     Recent Labs      01/03/19   1432  01/04/19   0319  01/04/19   0554   01/05/19   0500  01/06/19   0517   WBC   --   10.4  11.8*  6.3  4.9   NEUTSPOLYS   --   75.60*  75.00*  60.20  66.20   LYMPHOCYTES   --   18.20*  18.70*  33.60  22.70   MONOCYTES   --   5.30  5.40  4.40  8.70   EOSINOPHILS   --   0.20  0.30  1.80  1.60   BASOPHILS   --   0.30  0.30  0.00  0.60   ASTSGOT  60*  72*   --    --    --    ALTSGPT  34  38   --    --    --    ALKPHOSPHAT  98  70   --    --    --    TBILIRUBIN  2.4*  2.9*   --    --    --      Recent Labs      01/03/19   1432   01/04/19   0554   01/05/19   0500   01/05/19   1740  01/05/19   2300  01/06/19   0517   RBC   --    < >  2.40*   --   2.37*   --    --    --   2.38*   HEMOGLOBIN   --    < >  7.6*   < >  7.8*   < >  7.3*  7.3*  7.4*   HEMATOCRIT   --    < >  21.2*   < >  21.5*   < >  21.2*  21.6*  21.9*   PLATELETCT   --    < >  140*   --   135*   --    --    --   124*   PROTHROMBTM  46.2*   --    --    --    --    --    --    --    --    APTT  55.1*   --    --    --    --    --    --    --    --    INR  4.98*   --    --    --    --    --    --    --    --     < > = values in this interval not displayed.       Imaging  X-Ray:  I have personally reviewed the images and compared with prior images. and My impression is: Unchanged minimal perihilar infiltrates with bibasilar atelectasis, endotracheal tube/gastric tube/right IJ central venous catheter in good position  CT:    Reviewed CT abdomen 1/4  Diffuse colonic wall thickening is nonspecific but can be seen with infectious favored over inflammatory or ischemic colitis. Anasarca could also contribute    Right middle lobe groundglass and consolidation is compatible with bronchopneumonia favored over aspiration or edema    Cholelithiasis    Assessment/Plan  * Shock (HCC)   Assessment & Plan    Status post fluid/colloid resuscitation  Continue vasopressin infusion to maintain mean arterial pressure greater than 60  Start midodrine  Trend CVP     GI bleeding   Assessment & Plan    Secondary to esophageal  varices and portal gastropathy status post banding 1/4  Continue Protonix twice daily IV, completed 72 hours of octreotide  Serial CBC  Rocephin times 7 days     Acute respiratory failure with hypoxia (HCC)   Assessment & Plan    Intubated 1/3  Extubation trial today if tolerates spontaneous breathing trial with good parameters  RT/O2 protocols  Aspiration precautions  Sedatives     Hypocalcemia   Assessment & Plan    Repleted, currently within normal limits when corrected for low albumin  Monitor daily     Coagulopathy (HCC)   Assessment & Plan    Secondary to alcohol abuse  Monitor for bleeding     Aspiration pneumonia (HCC)   Assessment & Plan    Pansensitive Klebsiella  Continue Rocephin and Flagyl times 5 days     Acute blood loss anemia   Assessment & Plan    Serial CBC  Conservative transfusion strategy with goal hemoglobin greater than 7.0     History of alcohol dependence (HCC)   Assessment & Plan    Vitamin supplementation  Monitor for alcohol withdrawal syndrome  Eventual alcohol cessation education to be provided once patient is extubated and interviewable     End stage liver disease (HCC)   Assessment & Plan    Decompensated  Gastroenterology following  Monitor synthetic function  Lactulose, rifaximin, Zinc  Not able to tolerate diuretic nor propanolol at this time given shock     Toxic metabolic encephalopathy   Assessment & Plan    Improving  Limit sedatives and monitor neurologic function closely  Continue to correct underlying metabolic conditions     Severe protein-calorie malnutrition (HCC)   Assessment & Plan    Nutritionist following  Continue enteral nutrition, supplements as needed     Elevated troponin   Assessment & Plan    Secondary to demand ischemia -improved  Not an antiplatelet candidate     Hypophosphatemia   Assessment & Plan    Replete and monitor closely     Hypomagnesemia   Assessment & Plan    Replete and monitor closely     Hypokalemia   Assessment & Plan    Replete and monitor  "closely     Cocaine abuse (HCC)   Assessment & Plan    Drug cessation education to be provided once patient extubated   Monitor for potential withdrawal syndrome     Tobacco dependence   Assessment & Plan    Tobacco cessation education to be provided once extubated  Nicotine patch     Full code    VTE:  Contraindicated  Ulcer: PPI  Lines: Central Line  Ongoing indication addressed and Hendrickson Catheter  Ongoing indication addressed    I have performed a physical exam and reviewed and updated ROS and Plan today (1/6/2019). In review of yesterday's note (1/5/2019), there are no changes except as documented above.     I had a lengthy discussion with patient's 2 sisters at bedside today.  They wanted to transition him completely to comfort care saying that \"he would never have wanted any of this and he would be pissed for allowing him to live on life support.\"  I discussed with him that he is improving from his organ failure from this acute decompensation and that he is a good candidate for a one-way extubation but ongoing medical management.  They would like the vasopressin discontinued as well however.  Once he is extubated then we will attempt to gather from patient what his wishes are regarding ongoing medical management for his liver disease, infection.  CODE STATUS changed to DNR/DNI.    Discussed patient condition and risk of morbidity and/or mortality with Hospitalist, Family, RN, RT, Pharmacy, , Charge nurse / hot rounds and GI  The patient remains critically ill.  Critical care time = 60 minutes in directly providing and coordinating critical care and extensive data review.  No time overlap and excludes procedures.  "

## 2019-01-06 NOTE — CARE PLAN
Problem: Ventilation Defect:  Goal: Ability to achieve and maintain unassisted ventilation or tolerate decreased levels of ventilator support  Adult Ventilation Update    Total Vent Days: 4    Patient Lines/Drains/Airways Status    Active Airway     Name: Placement date: Placement time: Site: Days:    Airway ETT Oral 8.0 01/03/19 1740   Oral   4                 Rapid Shallow Breathing Index (RR/VT): 15 (01/05/19 1738)  Plateau Pressure (Q Shift): 15 (01/06/19 0235)  Static Compliance (ml / cm H2O): 48.6 (01/06/19 0235)    Patient failed trials because of Barriers to Wean: Other (Comments) (recently intubated) (01/04/19 0708)  Barriers to SBT Weaning Trial Stopped due to:: Pt weaned for 1 hour and returned to rest settings per protocol (01/05/19 1738)  Length of Weaning Trial Length of Weaning Trial (Hours): 1 (01/05/19 1738)             Sputum/Suction   Cough: Productive (01/06/19 0400)  Sputum Amount: Small (01/06/19 0400)  Sputum Color: White;Yellow (01/06/19 0400)  Sputum Consistency: Thick;Thin (01/06/19 0400)    Mobility  Level of Mobility: Level IV (01/06/19 0400)  Activity Performed: Sitting up in bed (01/06/19 0400)  Time Activity Tolerated: 5 min (01/06/19 0400)  Assistance / Tolerance: Tolerates Poorly (01/06/19 0400)  Pt Calls for Assistance: No (01/06/19 0400)  Staff Present for Mobilization: RN;Other (comment) (RN #2) (01/06/19 0400)  Gait: Unable to Ambulate (01/06/19 0400)  Reason Not Mobilized: Unstable condition (01/05/19 0800)  Mobilization Comments: Pt extremely stiff (01/06/19 0400)    Events/Summary/Plan:   Continue SBT's and montor respiratory status. Continue Q4 Duonebs

## 2019-01-06 NOTE — PROGRESS NOTES
Gastroenterology Progress Note     Author: Hansel Da Silva   Date & Time Created: 1/6/2019 1:53 PM    Chief Complaint:  Variceal bleed. Decompensated cirrhosis.    Interval History:  S: Extubated and able to speak but is confused. Denies pain or SOB. No recurrent bleed. Says he quit drinking 5 months ago. Long conversation with his sisters about which direction his health might go. 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 Sister states he is a [employer] grad.     Review of Systems:  Review of Systems   Respiratory: Negative.    Cardiovascular: Negative.    Gastrointestinal: Negative.    Neurological: Positive for weakness.       Physical Exam:  Physical Exam    Labs:  Recent Labs      01/04/19   0717  01/05/19   0429  01/06/19   0459   ISTATAPH  7.503*  7.440  7.384*   ISTATAPCO2  25.4*  30.5  34.3   ISTATAPO2  119*  100*  89*   ISTATATCO2  21  22  21   [id number]  99  98  97   ISTATARTHCO3  20.0  20.8  20.5   ISTATARTBE  -3  -3  -4   ISTATTEMP  99.2 F  98.7 F  98.9 F   ISTATFIO2  30  30  30   ISTATSPEC  Arterial  Arterial  Arterial   ISTATAPHTC  7.497  7.439  7.382*   [id number]  121*  100*  90*     Recent Labs      01/04/19   0554  01/04/19   1730  01/04/19   2329   TROPONINI  1.58*  1.19*  0.98*     Recent Labs      01/04/19   0554   01/04/19   1730   01/05/19   0500   01/05/19   2300  01/06/19   0517  01/06/19   1100   SODIUM  145   < >   --    --   145   --    --   143  143   POTASSIUM  2.9*   < >  2.9*   < >  3.2*   < >  3.7  3.9  4.1   CHLORIDE  115*   < >   --    --   115*   --    --   116*  117*   CO2  18*   < >   --    --   24   --    --   22  22   BUN  40*   < >   --    --   18   --    --   6*  7*   CREATININE  0.96   < >   --    --   0.54   --    --   0.42*  0.50   MAGNESIUM  1.0*   --   2.4   --   2.1   --    --   1.7   --    PHOSPHORUS  3.2   --    --    --   1.7*   --    --   1.6*   --    CALCIUM  6.9*   < >   --    --   7.7*   --    --   7.8*  7.6*    < > = values in this  interval not displayed.     Recent Labs      19   14319   0500  19   0519   1100   ALTSGPT  34   --   38   --    --    --    --    ASTSGOT  60*   --   72*   --    --    --    --    ALKPHOSPHAT  98   --   70   --    --    --    --    TBILIRUBIN  2.4*   --   2.9*   --    --    --    --    LIPASE  10*   --    --    --    --    --    --    GLUCOSE  111*   < >  128*   < >  129*  115*  97    < > = values in this interval not displayed.     Recent Labs      19   0554   19   05019   1740  19   2300  19   RBC   --    < >  2.40*   --   2.37*   --    --    --   2.38*   HEMOGLOBIN   --    < >  7.6*   < >  7.8*   < >  7.3*  7.3*  7.4*   HEMATOCRIT   --    < >  21.2*   < >  21.5*   < >  21.2*  21.6*  21.9*   PLATELETCT   --    < >  140*   --   135*   --    --    --   124*   PROTHROMBTM  46.2*   --    --    --    --    --    --    --    --    APTT  55.1*   --    --    --    --    --    --    --    --    INR  4.98*   --    --    --    --    --    --    --    --     < > = values in this interval not displayed.     Recent Labs      19   0554  19   0500  19   WBC   --    < >  10.4  11.8*  6.3  4.9   NEUTSPOLYS   --    < >  75.60*  75.00*  60.20  66.20   LYMPHOCYTES   --    < >  18.20*  18.70*  33.60  22.70   MONOCYTES   --    < >  5.30  5.40  4.40  8.70   EOSINOPHILS   --    < >  0.20  0.30  1.80  1.60   BASOPHILS   --    < >  0.30  0.30  0.00  0.60   ASTSGOT  60*   --   72*   --    --    --    ALTSGPT  34   --   38   --    --    --    ALKPHOSPHAT  98   --   70   --    --    --    TBILIRUBIN  2.4*   --   2.9*   --    --    --     < > = values in this interval not displayed.     Hemodynamics:  Temp (24hrs), Av.2 °C (99 °F), Min:37 °C (98.6 °F), Max:37.6 °C (99.7 °F)  Temperature: 37.3 °C (99.1 °F)  Pulse  Av.5  Min: 67  Max: 128Heart Rate (Monitored): 98  NIBP:  (!) 83/48  CVP (mm Hg): (!) 274 MM HG  Respiratory:  Loza Vent Mode: APVCMV, Rate (breaths/min): 16, PEEP/CPAP: 8, FiO2: 30, P Peak (PIP): 16, P MEAN: 10 Respiration: 13, Pulse Oximetry: 96 %     Work Of Breathing / Effort: Mild  RUL Breath Sounds: Clear, RML Breath Sounds: Clear, RLL Breath Sounds: Diminished, ERICA Breath Sounds: Clear, LLL Breath Sounds: Diminished  Fluids:    Intake/Output Summary (Last 24 hours) at 01/06/19 1353  Last data filed at 01/06/19 1200   Gross per 24 hour   Intake          5314.25 ml   Output             1990 ml   Net          3324.25 ml     Weight: 71.8 kg (158 lb 4.6 oz)  GI/Nutrition:  Orders Placed This Encounter   Procedures   • Diet NPO     Standing Status:   Standing     Number of Occurrences:   1     Order Specific Question:   Type:     Answer:   Now [1]     Order Specific Question:   Restrict to:     Answer:   Strict [1]     Medical Decision Making, by Problem:  Active Hospital Problems    Diagnosis   • Acute respiratory failure with hypoxia (HCC) [J96.01]   • GI bleeding [K92.2]   • Aspiration pneumonia (HCC) [J69.0]   • Coagulopathy (HCC) [D68.9]   • Hypocalcemia [E83.51]   • Toxic metabolic encephalopathy [G92]   • End stage liver disease (HCC) [K72.90]   • History of alcohol dependence (HCC) [F10.21]   • Acute blood loss anemia [D62]   • Hypophosphatemia [E83.39]   • Elevated troponin [R74.8]   • Severe protein-calorie malnutrition (HCC) [E43]   • Cocaine abuse (HCC) [F14.10]   • Hypokalemia [E87.6]   • Hypomagnesemia [E83.42]   • Tobacco dependence [F17.200]   Impression:  1. Recent variceal bleed s/p banding.  2. Decompensated cirrhosis.  3. Prot cordelia malnutrition  4. Ascites  5. Looks to be encephalopathic.  6. Sisters are currently making health decisions and he's a DNR but I told them if mental status improves he'll be able to serve as his own proxy and he can make decisions. At this point it's not clear if he'll recover enough to leave the hospital or continue to  decline but he's been trending better.     Plan:  1. OK to DC sandostatin-he's had 72 hrs.   2. Would continue antibiotics and lactulose/rifaximin.  GI following. Ethics consult is an option.     Quality-Core Measures

## 2019-01-06 NOTE — PROGRESS NOTES
Plan of care discussed with Dr. Gonda and sisters Sol and Stevenson. Orders received to extubate patient and discontinue vasopressors. Per MD continue all other medications

## 2019-01-07 PROBLEM — E83.42 HYPOMAGNESEMIA: Status: RESOLVED | Noted: 2019-01-01 | Resolved: 2019-01-01

## 2019-01-07 PROBLEM — E87.6 HYPOKALEMIA: Status: RESOLVED | Noted: 2019-01-01 | Resolved: 2019-01-01

## 2019-01-07 NOTE — PROGRESS NOTES
Critical Care Progress Note    Date of admission  1/3/2019    Chief Complaint  58 y.o. male admitted 1/3/2019 with hematemesis and AMS    Hospital Course    58-year-old gentleman with no known   medical history except for longstanding alcohol use, some form of possible   liver disease and per cousin a significant amount of weight loss over this   past year.  Patient lives apparently in [place], has a roommate, was   reportedly seen normal last night.  In and around 3 in the morning, he had   made some noise that the roommate responded to and he subsequently said that   he was okay, but did have apparently some emesis at that time.  Later this   morning, he was found by his roommate lying on the ground with what appeared   to be a large amount of bloody vomitus.  EMS was called and patient was   subsequently brought into [facility] for further evaluation.  At this time, he is   unresponsive, poorly arousable, will open eyes to deep sternal rub, but not   communicate or answer.  His cousin is at bedside, has a very minimal past   medical history and states that he does not divulge much, but has some form of   underlying liver disease.  Patient of note also does spend a moderate amount   of the year in [place] and had perhaps some kind of GI bleed while he was there   last.  She indicates that he was a heavy drinker, but believed that he had   stopped drinking for the last several months.        Interval Problem Update  Reviewed last 24 hour events:  Tm 100.1  -3.4L over last 24hr  No cxr this am  No labs    Transitioned to comfort measures yesterday      Review of Systems  Review of Systems   Unable to perform ROS: Acuity of condition        Vital Signs for last 24 hours   Temp:  [37.3 °C (99.1 °F)-37.8 °C (100.1 °F)] 37.8 °C (100.1 °F)  Pulse:  [] 108  Resp:  [11-28] 16       Hemodynamic parameters for last 24 hours  CVP:  [7 MM HG-277 MM HG] 235 MM HG    Respiratory  Loza Vent Mode: APVCMV  Rate  (breaths/min): 16  Vt Target (mL): 410  PEEP/CPAP: 8  FiO2: 30  P Support: 5  P MEAN: 10  Length of Weaning Trial (Hours): 1  Control VTE (exp VT): 624     Physical Exam   Physical Exam   Constitutional: He appears well-developed and well-nourished.   HENT:   Head: Normocephalic.   Eyes: Scleral icterus is present.   Cardiovascular: Normal rate and intact distal pulses.    Pulmonary/Chest: No stridor. He has no wheezes.   Musculoskeletal: He exhibits no edema.   Neurological: No cranial nerve deficit.   Psychiatric:   lethargic   Nursing note and vitals reviewed.      Medications  Current Facility-Administered Medications   Medication Dose Route Frequency Provider Last Rate Last Dose   • MD ALERT...adult comfort care   Other PRN William Osuna M.D.       • atropine 1 % ophthalmic solution 2 Drop  2 Drop Sublingual Q4HRS PRN William Osuna M.D.       • morphine (ROXANOL) 20 MG/ML oral conc 10 mg  10 mg Oral Q HOUR PRN William Osuna M.D.        Or   • morphine (pf) 10 mg/mL injection 5 mg  5 mg Intravenous Q HOUR PRN William Osuna M.D.   5 mg at 01/06/19 2026    Or   • morphine (pf) 10 mg/mL injection 10 mg  10 mg Intravenous Q HOUR PRN William Osuna M.D.   10 mg at 01/07/19 0801   • ondansetron (ZOFRAN ODT) dispertab 8 mg  8 mg Oral Q8HRS PRN William Osuna M.D.        Or   • ondansetron (ZOFRAN) syringe/vial injection 8 mg  8 mg Intravenous Q8HRS PRN William Osuna M.D.       • LORazepam (ATIVAN) 2 MG/ML oral conc 1 mg  1 mg Sublingual Q HOUR PRN William Osuna M.D.        Or   • LORazepam (ATIVAN) injection 1 mg  1 mg Intravenous Q HOUR PRN William Osuna M.D.   1 mg at 01/07/19 0801       Fluids    Intake/Output Summary (Last 24 hours) at 01/07/19 0831  Last data filed at 01/07/19 0800   Gross per 24 hour   Intake          1456.14 ml   Output             5210 ml   Net         -3753.86 ml       Laboratory  Recent Labs      01/05/19   0429  01/06/19   0459   ISTATAPH  7.440  7.384*    ISTATAPCO2  30.5  34.3   ISTATAPO2  100*  89*   ISTATATCO2  22  21   [id number]  98  97   ISTATARTHCO3  20.8  20.5   ISTATARTBE  -3  -4   ISTATTEMP  98.7 F  98.9 F   ISTATFIO2  30  30   ISTATSPEC  Arterial  Arterial   ISTATAPHTC  7.439  7.382*   [id number]  100*  90*     Recent Labs      01/04/19 1730 01/04/19   2329   TROPONINI  1.19*  0.98*     Recent Labs      01/04/19 1730 01/05/19 0500 01/05/19 2300 01/06/19 0517 01/06/19   1100   SODIUM   --    --   145   --    --   143  143   POTASSIUM  2.9*   < >  3.2*   < >  3.7  3.9  4.1   CHLORIDE   --    --   115*   --    --   116*  117*   CO2   --    --   24   --    --   22  22   BUN   --    --   18   --    --   6*  7*   CREATININE   --    --   0.54   --    --   0.42*  0.50   MAGNESIUM  2.4   --   2.1   --    --   1.7   --    PHOSPHORUS   --    --   1.7*   --    --   1.6*   --    CALCIUM   --    --   7.7*   --    --   7.8*  7.6*    < > = values in this interval not displayed.     Recent Labs      01/05/19 0500 01/05/19 2300 01/06/19 0517 01/06/19   1100   PREALBUMIN   --   8.0*   --    --    GLUCOSE  129*   --   115*  97     Recent Labs      01/05/19 0500 01/06/19 0517   WBC  6.3  4.9   NEUTSPOLYS  60.20  66.20   LYMPHOCYTES  33.60  22.70   MONOCYTES  4.40  8.70   EOSINOPHILS  1.80  1.60   BASOPHILS  0.00  0.60     Recent Labs      01/05/19 0500 01/05/19 1740 01/05/19 2300 01/06/19 0517   RBC  2.37*   --    --    --   2.38*   HEMOGLOBIN  7.8*   < >  7.3*  7.3*  7.4*   HEMATOCRIT  21.5*   < >  21.2*  21.6*  21.9*   PLATELETCT  135*   --    --    --   124*    < > = values in this interval not displayed.       Imaging  X-Ray:  No film today   CT abdomen 1/4  Diffuse colonic wall thickening is nonspecific but can be seen with infectious favored over inflammatory or ischemic colitis. Anasarca could also contribute    Right middle lobe groundglass and consolidation is compatible with bronchopneumonia favored over aspiration or  edema    Cholelithiasis    Assessment/Plan  * Shock (HCC)   Assessment & Plan    Transitioned to comfort measures  Continue to titrate comfort meds     GI bleeding   Assessment & Plan    resolved     Acute respiratory failure with hypoxia (HCC)   Assessment & Plan    Aspiration precautions  morphine     End stage liver disease (HCC)   Assessment & Plan    Comfort measures           VTE:  Contraindicated  Ulcer: Not Indicated  Lines: Central Line  Ongoing indication addressed and Hendrickson Catheter  Ongoing indication addressed    I have performed a physical exam and reviewed and updated ROS and Plan today (1/7/2019). In review of yesterday's note (1/6/2019), there are no changes except as documented above.     Discussed patient condition and risk of morbidity and/or mortality with Hospitalist, Family, RN, RT, Pharmacy, , Charge nurse / hot rounds and GI

## 2019-01-07 NOTE — CARE PLAN
Problem: Nutritional:  Goal: Nutrition support tolerated and meeting greater than 85% of estimated needs  Outcome: DISCHARGED-GOAL NOT MET Date Met: 01/07/19  Pt discharged from RD care s/p comfort care status.  TF is off.  Please re-consult RD prn.

## 2019-01-07 NOTE — PROGRESS NOTES
Patient and family requesting private notary for personal documents regarding estate. Dr. Osuna notified and will assess mental status.

## 2019-01-07 NOTE — PROGRESS NOTES
[facility] Medicine Daily Progress Note    Date of Service  1/7/2019    Chief Complaint  58 y.o. male admitted 1/3/2019 with altered mentation    Hospital Course    Hx ETOH.  Found by room mate altered on the floor with a large amount of bloody emesis.  Intubated on arrival for airway protection.        Interval Problem Update        Patient transition to comfort care measures    Sisters at bedside with multiple questions              Consultants/Specialty  GI  Pulmonology      Code Status  DNR/DNI    Disposition  ICU    Review of Systems  Review of Systems   Unable to perform ROS: Mental status change        Physical Exam  Temp:  [37.3 °C (99.1 °F)-37.8 °C (100.1 °F)] 37.8 °C (100.1 °F)  Pulse:  [] 108  Resp:  [11-28] 16    Physical Exam   Constitutional: He appears well-developed and well-nourished.   HENT:   Head: Normocephalic and atraumatic.   Mouth/Throat: Oropharynx is clear and moist.   Eyes: Conjunctivae are normal. Right eye exhibits no discharge. Left eye exhibits no discharge.   Neck: Neck supple. No JVD present. No tracheal deviation present.   Cardiovascular: Normal rate, regular rhythm and normal heart sounds.    Pulmonary/Chest: Effort normal. No respiratory distress. He has decreased breath sounds. He exhibits no tenderness.   Abdominal: Soft. Bowel sounds are normal. He exhibits distension. There is no tenderness. There is no rebound.   Musculoskeletal: He exhibits edema. He exhibits no tenderness.   Neurological: No cranial nerve deficit. He exhibits normal muscle tone.   Lethargic   Skin: Skin is warm and dry. No rash noted. He is not diaphoretic. No cyanosis. Nails show no clubbing.   Psychiatric: He is slowed. He is noncommunicative.   Nursing note and vitals reviewed.      Fluids    Intake/Output Summary (Last 24 hours) at 01/07/19 0934  Last data filed at 01/07/19 0800   Gross per 24 hour   Intake          1456.14 ml   Output             5210 ml   Net         -3753.86 ml        Laboratory  Recent Labs      01/05/19   0500   01/05/19   1740  01/05/19   2300  01/06/19   0517   WBC  6.3   --    --    --   4.9   RBC  2.37*   --    --    --   2.38*   HEMOGLOBIN  7.8*   < >  7.3*  7.3*  7.4*   HEMATOCRIT  21.5*   < >  21.2*  21.6*  21.9*   MCV  90.7   --    --    --   92.0   MCH  32.9   --    --    --   31.1   MCHC  36.3*   --    --    --   33.8   RDW  56.1*   --    --    --   62.4*   PLATELETCT  135*   --    --    --   124*   MPV  9.7   --    --    --   9.8    < > = values in this interval not displayed.     Recent Labs      01/05/19   0500   01/05/19   2300  01/06/19   0517  01/06/19   1100   SODIUM  145   --    --   143  143   POTASSIUM  3.2*   < >  3.7  3.9  4.1   CHLORIDE  115*   --    --   116*  117*   CO2  24   --    --   22  22   GLUCOSE  129*   --    --   115*  97   BUN  18   --    --   6*  7*   CREATININE  0.54   --    --   0.42*  0.50   CALCIUM  7.7*   --    --   7.8*  7.6*    < > = values in this interval not displayed.             Recent Labs      01/04/19   2329   TRIGLYCERIDE  96       Imaging  DX-CHEST-PORTABLE (1 VIEW)   Final Result         1. Lines and tubes as above.   2. Hypoinflation with mild left basilar atelectasis. No new consolidation or pleural effusions.      DX-ABDOMEN FOR TUBE PLACEMENT   Final Result      Enteric tube terminates over the stomach.      DX-CHEST-PORTABLE (1 VIEW)   Final Result         1. Stable lines and tubes.   2. Hypoinflation. No new consolidation or pleural effusions.      EC-ECHOCARDIOGRAM COMPLETE W/O CONT   Final Result      DX-CHEST-PORTABLE (1 VIEW)   Final Result      Increasing middle lobe opacification is worrisome for pneumonia      CT-HEAD W/O   Final Result      Moderate atrophy without acute intracranial abnormality identified      Chronic right maxillary sinus inflammatory disease with possible prior fracture      CT-RENAL COLIC EVALUATION(A/P W/O)   Final Result      Diffuse colonic wall thickening is nonspecific but can be  seen with infectious favored over inflammatory or ischemic colitis. Anasarca could also contribute      Right middle lobe groundglass and consolidation is compatible with bronchopneumonia favored over aspiration or edema      Cholelithiasis      DX-CHEST-PORTABLE (1 VIEW)   Final Result      Endotracheal tube and right IJ central line as described above.      Hypoventilatory change.           Assessment/Plan  GI bleeding   Assessment & Plan    Status post banding of esophageal varices       Acute respiratory failure with hypoxia (HCC)   Assessment & Plan    Intubated for airway protection 1/3  Secondary to aspiration pneumonia  One-way extubation on 1/6/2019         Aspiration pneumonia (HCC)   Assessment & Plan    Secondary to Klebsiella pneumonia            Acute blood loss anemia   Assessment & Plan    Bleeding resolved       History of alcohol dependence (HCC)   Assessment & Plan     with alcoholic liver cirrhosis     End stage liver disease (HCC)   Assessment & Plan    Likely secondary to history of alcoholism  Hepatitis serology negative       Toxic metabolic encephalopathy   Assessment & Plan    Multifactorial from GI bleed  Hepatic encephalopathy         Elevated troponin   Assessment & Plan    Likely demand ischemia   echo normal EF and LV     Hypophosphatemia   Assessment & Plan    Replete and monitor      Cocaine abuse (HCC)   Assessment & Plan    Noted on urine drug screen       Tobacco dependence   Assessment & Plan     on cessation post extubation          Reviewed comfort care measures with patient sisters at the bedside  Discussed with nursing staff  Continue with as needed morphine sulfate 5-15 mg/h as needed  Continue as needed lorazepam      VTE prophylaxis: comfort care

## 2019-01-07 NOTE — DISCHARGE PLANNING
Anticipated Discharge Disposition: placed on comfort care    Action: Received order for hospice. Spoke to pt's sisters at bedside. Pt has been placed on comfort care and they hope he will pass peacfully here. They hope he will not languish. Everyone has been to see pt. He spoke to them yesterday which caused hope and was difficult to overcome. LSw offered emotional support.      Barriers to Discharge: TBD    Plan: pt placed on comfort care and may pass in hospital

## 2019-01-07 NOTE — DISCHARGE PLANNING
Anticipated Discharge Disposition: comfort care    Action: Received call from other family members. There are family dynamics causing issues between family members.    Lsw spoke to pt's biological sisters at bedside to indicate all people should be able to see pt. They repeat everyone has seen him. There is one cousin who gossips and they refuse to allow her to cause anymore issues.    Updated bedside RN as above.     Barriers to Discharge: TBD    Plan: f/u w/ medical team

## 2019-01-07 NOTE — PLAN OF CARE (IOPOC)
I was asked by the nursing staff to evaluate for medical decision making capacity. Pt wishes to designate family members as his surrogates.    At time of my exam pt was awake, alert and appropriate, He is fully oriented and seems to understand the gravity of his medical condition. In my opinion this patient has medical decision making capacity.

## 2019-01-08 NOTE — PROGRESS NOTES
Critical Care Progress Note    Date of admission  1/3/2019    Chief Complaint  58 y.o. male admitted 1/3/2019 with hematemesis and AMS    Hospital Course    58-year-old gentleman with no known   medical history except for longstanding alcohol use, some form of possible   liver disease and per cousin a significant amount of weight loss over this   past year.  Patient lives apparently in [place], has a roommate, was   reportedly seen normal last night.  In and around 3 in the morning, he had   made some noise that the roommate responded to and he subsequently said that   he was okay, but did have apparently some emesis at that time.  Later this   morning, he was found by his roommate lying on the ground with what appeared   to be a large amount of bloody vomitus.  EMS was called and patient was   subsequently brought into [facility] for further evaluation.  At this time, he is   unresponsive, poorly arousable, will open eyes to deep sternal rub, but not   communicate or answer.  His cousin is at bedside, has a very minimal past   medical history and states that he does not divulge much, but has some form of   underlying liver disease.  Patient of note also does spend a moderate amount   of the year in [place] and had perhaps some kind of GI bleed while he was there   last.  She indicates that he was a heavy drinker, but believed that he had   stopped drinking for the last several months.        Interval Problem Update  Reviewed last 24 hour events:  Tm 98.6  -650cc over last 24hr  No cxr this am  No labs    Remains on comfort care  I have adjusted meds to improve comfort level      Review of Systems  Review of Systems   Unable to perform ROS: Acuity of condition        Vital Signs for last 24 hours   Pulse:  [94-95] 95  Resp:  [15-16] 16  BP: (64)/(40) 64/40       Hemodynamic parameters for last 24 hours       Respiratory        Physical Exam   Physical Exam   Constitutional: He appears well-developed and  well-nourished.   HENT:   Head: Normocephalic.   Eyes: Scleral icterus is present.   Cardiovascular: Normal rate and intact distal pulses.    Pulmonary/Chest: No stridor. He has no wheezes.   Musculoskeletal: He exhibits no edema.   Neurological: No cranial nerve deficit.   Psychiatric:   lethargic   Nursing note and vitals reviewed.      Medications  Current Facility-Administered Medications   Medication Dose Route Frequency Provider Last Rate Last Dose   • morphine (pf) 10 mg/mL injection 15 mg  15 mg Intravenous Q HOUR PRN Lico Chavez M.D.   15 mg at 01/08/19 0612    Or   • morphine (ROXANOL) 20 MG/ML oral conc 10 mg  10 mg Oral Q HOUR PRN Lico Chavez M.D.        Or   • morphine (pf) 10 mg/mL injection 5 mg  5 mg Intravenous Q HOUR PRN Lico Chavez M.D.   5 mg at 01/07/19 2046   • MD ALERT...adult comfort care   Other PRN William Osuna M.D.       • atropine 1 % ophthalmic solution 2 Drop  2 Drop Sublingual Q4HRS PRN William Osuna M.D.       • ondansetron (ZOFRAN ODT) dispertab 8 mg  8 mg Oral Q8HRS PRN William Osuna M.D.        Or   • ondansetron (ZOFRAN) syringe/vial injection 8 mg  8 mg Intravenous Q8HRS PRN William Osuna M.D.       • LORazepam (ATIVAN) 2 MG/ML oral conc 1 mg  1 mg Sublingual Q HOUR PRN William Osuna M.D.        Or   • LORazepam (ATIVAN) injection 1 mg  1 mg Intravenous Q HOUR PRN William Osuna M.D.   1 mg at 01/08/19 0553       Fluids    Intake/Output Summary (Last 24 hours) at 01/08/19 0827  Last data filed at 01/08/19 0500   Gross per 24 hour   Intake                0 ml   Output              610 ml   Net             -610 ml       Laboratory  Recent Labs      01/06/19   0459   ISTATAPH  7.384*   ISTATAPCO2  34.3   ISTATAPO2  89*   ISTATATCO2  21   [id number]  97   ISTATARTHCO3  20.5   ISTATARTBE  -4   ISTATTEMP  98.9 F   ISTATFIO2  30   ISTATSPEC  Arterial   ISTATAPHTC  7.382*   [id number]  90*         Recent Labs      01/05/19   0886   01/06/19   0517 01/06/19   1100   SODIUM   --   143  143   POTASSIUM  3.7  3.9  4.1   CHLORIDE   --   116*  117*   CO2   --   22  22   BUN   --   6*  7*   CREATININE   --   0.42*  0.50   MAGNESIUM   --   1.7   --    PHOSPHORUS   --   1.6*   --    CALCIUM   --   7.8*  7.6*     Recent Labs      01/05/19   2300  01/06/19   0517  01/06/19   1100   PREALBUMIN  8.0*   --    --    GLUCOSE   --   115*  97     Recent Labs      01/06/19 0517   WBC  4.9   NEUTSPOLYS  66.20   LYMPHOCYTES  22.70   MONOCYTES  8.70   EOSINOPHILS  1.60   BASOPHILS  0.60     Recent Labs      01/05/19   1740  01/05/19 2300 01/06/19 0517   RBC   --    --   2.38*   HEMOGLOBIN  7.3*  7.3*  7.4*   HEMATOCRIT  21.2*  21.6*  21.9*   PLATELETCT   --    --   124*       Imaging  X-Ray:  No film today   CT abdomen 1/4  Diffuse colonic wall thickening is nonspecific but can be seen with infectious favored over inflammatory or ischemic colitis. Anasarca could also contribute    Right middle lobe groundglass and consolidation is compatible with bronchopneumonia favored over aspiration or edema    Cholelithiasis    Assessment/Plan  * Shock (HCC)   Assessment & Plan    Transitioned to comfort measures  Continue to titrate comfort meds     GI bleeding   Assessment & Plan    resolved     Acute respiratory failure with hypoxia (HCC)   Assessment & Plan    Aspiration precautions  morphine     End stage liver disease (HCC)   Assessment & Plan    Comfort measures           VTE:  Contraindicated  Ulcer: Not Indicated  Lines: Central Line  Ongoing indication addressed and Hendrickson Catheter  Ongoing indication addressed    I have performed a physical exam and reviewed and updated ROS and Plan today (1/8/2019). In review of yesterday's note (1/7/2019), there are no changes except as documented above.     Discussed patient condition and risk of morbidity and/or mortality with Hospitalist, Family, RN, RT, Pharmacy,  and Charge nurse / hot rounds

## 2019-01-08 NOTE — CARE PLAN
Problem: Communication  Goal: The ability to communicate needs accurately and effectively will improve  Appropriate signs on doorway. Bed in lowest position and locked.  Call light and phone within reach. Family updated over the phone    Problem: Pain Management  Goal: Pain level will decrease to patient's comfort goal  Outcome: PROGRESSING SLOWER THAN EXPECTED  Patient comfort measures, PRN medications for agitation and pain per MAR.

## 2019-01-08 NOTE — PALLIATIVE CARE
Referral received- Patient transitioned to Comfort Care 1/6/19, Referral sent to Hospice.  Call to SW to confirm she will received choice.  Call to RN; patient/family no needs.  Contact to Dr. Tom Venegas; DC order, SW notified  Please re-consult if new needs arise.

## 2019-01-08 NOTE — PROGRESS NOTES
Patient became bradycardic and asystolic at 1336. PEA persisted for 35 minutes. 2 minute asystole strip obtained at 1412. 2 RN pronounce complete at 1420. Patient's sisters Chaz and Katherine at bedside.

## 2019-01-08 NOTE — DISCHARGE PLANNING
Anticipated Discharge Disposition: comfort care    Action: Received a couple VMs from pt's family. Lsw explained to unit management as pt's sister in law, Maddie, is requesting to speak w/ management.     Manager spoke to Maddie.    Barriers to Discharge: none    Plan: pt on comfort care

## 2019-01-08 NOTE — DISCHARGE PLANNING
Anticipated Discharge Disposition: comfort care    Action: Spoke to palliative RN Fredis.    Pt is on comfort care and family aware of hospice order, but hoping pt will pass here. RN Fredis will cancel palliative referral if she is not needed.    Barriers to Discharge: none    Plan: pt on comfort care

## 2019-01-08 NOTE — PROGRESS NOTES
Hospital Medicine Daily Progress Note    Date of Service  1/8/2019    Chief Complaint  58 y.o. male admitted 1/3/2019 with altered mentation    Hospital Course    Hx ETOH.  Found by room mate altered on the floor with a large amount of bloody emesis.  Intubated on arrival for airway protection.        Interval Problem Update    Patient remains on comfort care measures  No overnight events  Sister is at bedside                  Consultants/Specialty  GI  Pulmonology      Code Status  DNR/DNI    Disposition  ICU    Review of Systems  Review of Systems   Unable to perform ROS: Mental status change        Physical Exam  Pulse:  [94-95] 95  Resp:  [15-16] 16  BP: (64)/(40) 64/40    Physical Exam   Constitutional: He appears well-developed and well-nourished.   HENT:   Head: Normocephalic and atraumatic.   Right Ear: External ear normal.   Left Ear: External ear normal.   Eyes: Right eye exhibits no discharge. Left eye exhibits no discharge.   Neck: Neck supple. No JVD present. No tracheal deviation present.   Cardiovascular: Normal rate and regular rhythm.    Pulmonary/Chest: No stridor. Bradypnea noted. He has decreased breath sounds in the right lower field and the left lower field. He has rhonchi. He exhibits no tenderness.   Abdominal: Soft. Bowel sounds are normal. He exhibits distension. There is no tenderness. There is no rebound.   Musculoskeletal: He exhibits edema. He exhibits no tenderness.   Neurological:   Patient is lethargic   Skin: Skin is warm and dry. He is not diaphoretic. No cyanosis. Nails show no clubbing.   Psychiatric: Cognition and memory are impaired. He is noncommunicative.   Nursing note and vitals reviewed.      Fluids    Intake/Output Summary (Last 24 hours) at 01/08/19 0954  Last data filed at 01/08/19 0500   Gross per 24 hour   Intake                0 ml   Output              610 ml   Net             -610 ml       Laboratory  Recent Labs      01/05/19   1740  01/05/19   2300  01/06/19    0517   WBC   --    --   4.9   RBC   --    --   2.38*   HEMOGLOBIN  7.3*  7.3*  7.4*   HEMATOCRIT  21.2*  21.6*  21.9*   MCV   --    --   92.0   MCH   --    --   31.1   MCHC   --    --   33.8   RDW   --    --   62.4*   PLATELETCT   --    --   124*   MPV   --    --   9.8     Recent Labs      01/05/19   2300  01/06/19   0517  01/06/19   1100   SODIUM   --   143  143   POTASSIUM  3.7  3.9  4.1   CHLORIDE   --   116*  117*   CO2   --   22  22   GLUCOSE   --   115*  97   BUN   --   6*  7*   CREATININE   --   0.42*  0.50   CALCIUM   --   7.8*  7.6*                   Imaging  DX-CHEST-PORTABLE (1 VIEW)   Final Result         1. Lines and tubes as above.   2. Hypoinflation with mild left basilar atelectasis. No new consolidation or pleural effusions.      DX-ABDOMEN FOR TUBE PLACEMENT   Final Result      Enteric tube terminates over the stomach.      DX-CHEST-PORTABLE (1 VIEW)   Final Result         1. Stable lines and tubes.   2. Hypoinflation. No new consolidation or pleural effusions.      EC-ECHOCARDIOGRAM COMPLETE W/O CONT   Final Result      DX-CHEST-PORTABLE (1 VIEW)   Final Result      Increasing middle lobe opacification is worrisome for pneumonia      CT-HEAD W/O   Final Result      Moderate atrophy without acute intracranial abnormality identified      Chronic right maxillary sinus inflammatory disease with possible prior fracture      CT-RENAL COLIC EVALUATION(A/P W/O)   Final Result      Diffuse colonic wall thickening is nonspecific but can be seen with infectious favored over inflammatory or ischemic colitis. Anasarca could also contribute      Right middle lobe groundglass and consolidation is compatible with bronchopneumonia favored over aspiration or edema      Cholelithiasis      DX-CHEST-PORTABLE (1 VIEW)   Final Result      Endotracheal tube and right IJ central line as described above.      Hypoventilatory change.           Assessment/Plan  GI bleeding   Assessment & Plan    Status post banding of  esophageal varices       Acute respiratory failure with hypoxia (HCC)   Assessment & Plan    Intubated for airway protection 1/3  Secondary to aspiration pneumonia  One-way extubation on 1/6/2019         Aspiration pneumonia (HCC)   Assessment & Plan    Secondary to Klebsiella pneumonia            Acute blood loss anemia   Assessment & Plan    Bleeding resolved       History of alcohol dependence (HCC)   Assessment & Plan     with alcoholic liver cirrhosis     End stage liver disease (HCC)   Assessment & Plan    Likely secondary to history of alcoholism  Hepatitis serology negative       Toxic metabolic encephalopathy   Assessment & Plan    Multifactorial from GI bleed  Hepatic encephalopathy         Elevated troponin   Assessment & Plan    Likely demand ischemia   echo normal EF and LV     Hypophosphatemia   Assessment & Plan    Replete and monitor      Cocaine abuse (HCC)   Assessment & Plan    Noted on urine drug screen       Tobacco dependence   Assessment & Plan     on cessation post extubation          Symptoms are controlled continue comfort care measures  Discussed with sisters at bedside and nursing staff      VTE prophylaxis: comfort care

## 2019-01-08 NOTE — PROGRESS NOTES
Comfort care measures in place this shift. Plan of care discussed with sisters. Emotional support provided.

## 2019-01-09 NOTE — DISCHARGE SUMMARY
Death Summary         Cause of Death  Acute respiratory failure due to aspiration pneumonia due to GI bleed due to liver cirrhosis.    Comorbid Conditions at the Time of Death  Principal Problem:    Shock (HCC) POA: Unknown  Active Problems:    Acute respiratory failure with hypoxia (HCC) POA: Unknown    GI bleeding POA: Unknown    Toxic metabolic encephalopathy POA: Unknown    End stage liver disease (HCC) POA: Unknown    History of alcohol dependence (HCC) POA: Unknown    Acute blood loss anemia POA: Unknown    Aspiration pneumonia (HCC) POA: Unknown    Coagulopathy (HCC) POA: Unknown    Hypocalcemia POA: Unknown    Tobacco dependence POA: Unknown    Cocaine abuse (HCC) POA: Unknown    Hypophosphatemia POA: Unknown    Elevated troponin POA: Unknown    Severe protein-calorie malnutrition (HCC) POA: Unknown  Resolved Problems:    Hypokalemia POA: Unknown    Hypomagnesemia POA: Unknown      History of Presenting Illness and Hospital Course  This is a 58 y.o. male admitted 1/3/2019 with history of liver cirrhosis secondary to alcoholism admitted with GI bleed intubated.  Patient was evaluated by GI and critical care he had upper endoscopy with banding of esophageal varices.  He was on antibiotics and he was intubated on mechanical ventilation.  After discussion with his family they elected to proceed with one-way extubation and subsequently transitioned to comfort care measures which were initiated.    Death Date: 01/08/19   Death Time: 1420         Pronounced By (RN1): Aleida Buchanan  Pronounced By (RN2): Ludmila Valle

## 2019-01-30 LAB
FUNGUS SPEC CULT: ABNORMAL
FUNGUS SPEC CULT: ABNORMAL
SIGNIFICANT IND 70042: ABNORMAL
SITE SITE: ABNORMAL
SOURCE SOURCE: ABNORMAL

## 2019-03-01 LAB
MYCOBACTERIUM SPEC CULT: NORMAL
RHODAMINE-AURAMINE STN SPEC: NORMAL
SIGNIFICANT IND 70042: NORMAL
SITE SITE: NORMAL
SOURCE SOURCE: NORMAL

## (undated) DEVICE — FILM CASSETTE ENDO

## (undated) DEVICE — CON SEDATION/>5 YR 1ST 15 MIN

## (undated) DEVICE — BITE BLOCK ADULT 60FR (100EA/CA)

## (undated) DEVICE — CON SEDATION EA ADDL 15 MIN

## (undated) DEVICE — KIT CUSTOM PROCEDURE SINGLE FOR ENDO  (15/CA)

## (undated) DEVICE — SPEEDBAND SUPERVIEW SUPER 7